# Patient Record
Sex: FEMALE | Race: WHITE | Employment: FULL TIME | URBAN - METROPOLITAN AREA
[De-identification: names, ages, dates, MRNs, and addresses within clinical notes are randomized per-mention and may not be internally consistent; named-entity substitution may affect disease eponyms.]

---

## 2017-08-23 ENCOUNTER — OFFICE VISIT (OUTPATIENT)
Dept: FAMILY MEDICINE CLINIC | Age: 57
End: 2017-08-23

## 2017-08-23 VITALS
TEMPERATURE: 98.6 F | SYSTOLIC BLOOD PRESSURE: 108 MMHG | RESPIRATION RATE: 16 BRPM | DIASTOLIC BLOOD PRESSURE: 64 MMHG | HEIGHT: 68 IN | HEART RATE: 89 BPM | BODY MASS INDEX: 26.22 KG/M2 | WEIGHT: 173 LBS | OXYGEN SATURATION: 98 %

## 2017-08-23 DIAGNOSIS — I25.10 ATHEROSCLEROSIS OF CORONARY ARTERY OF NATIVE HEART WITHOUT ANGINA PECTORIS, UNSPECIFIED VESSEL OR LESION TYPE: ICD-10-CM

## 2017-08-23 DIAGNOSIS — G43.809 VARIANTS OF MIGRAINE: ICD-10-CM

## 2017-08-23 DIAGNOSIS — K83.9 BILIARY AND GALLBLADDER DISORDER: ICD-10-CM

## 2017-08-23 DIAGNOSIS — R10.9 FLANK PAIN: ICD-10-CM

## 2017-08-23 DIAGNOSIS — Z12.11 SCREEN FOR COLON CANCER: ICD-10-CM

## 2017-08-23 DIAGNOSIS — Z00.00 WELL ADULT EXAM: Primary | ICD-10-CM

## 2017-08-23 DIAGNOSIS — I77.1 SUBCLAVIAN ARTERIAL STENOSIS (HCC): ICD-10-CM

## 2017-08-23 DIAGNOSIS — E04.1 THYROID CYST: ICD-10-CM

## 2017-08-23 DIAGNOSIS — J40 BRONCHITIS: ICD-10-CM

## 2017-08-23 DIAGNOSIS — F17.200 SMOKER: ICD-10-CM

## 2017-08-23 LAB
A/G RATIO: 1.7 (ref 1.1–2.2)
ALBUMIN SERPL-MCNC: 4.5 G/DL (ref 3.4–5)
ALP BLD-CCNC: 108 U/L (ref 40–129)
ALT SERPL-CCNC: 30 U/L (ref 10–40)
ANION GAP SERPL CALCULATED.3IONS-SCNC: 16 MMOL/L (ref 3–16)
AST SERPL-CCNC: 22 U/L (ref 15–37)
BACTERIA: ABNORMAL /HPF
BASOPHILS ABSOLUTE: 0.1 K/UL (ref 0–0.2)
BASOPHILS RELATIVE PERCENT: 0.9 %
BILIRUB SERPL-MCNC: 0.4 MG/DL (ref 0–1)
BILIRUBIN URINE: NEGATIVE
BLOOD, URINE: ABNORMAL
BUN BLDV-MCNC: 6 MG/DL (ref 7–20)
CALCIUM SERPL-MCNC: 9.8 MG/DL (ref 8.3–10.6)
CHLORIDE BLD-SCNC: 101 MMOL/L (ref 99–110)
CHOLESTEROL, TOTAL: 267 MG/DL (ref 0–199)
CLARITY: ABNORMAL
CO2: 24 MMOL/L (ref 21–32)
COLOR: YELLOW
CREAT SERPL-MCNC: 0.6 MG/DL (ref 0.6–1.1)
EOSINOPHILS ABSOLUTE: 0.2 K/UL (ref 0–0.6)
EOSINOPHILS RELATIVE PERCENT: 3.1 %
EPITHELIAL CELLS, UA: 0 /HPF (ref 0–5)
GFR AFRICAN AMERICAN: >60
GFR NON-AFRICAN AMERICAN: >60
GLOBULIN: 2.7 G/DL
GLUCOSE BLD-MCNC: 92 MG/DL (ref 70–99)
GLUCOSE URINE: NEGATIVE MG/DL
HCT VFR BLD CALC: 40.1 % (ref 36–48)
HDLC SERPL-MCNC: 60 MG/DL (ref 40–60)
HEMOGLOBIN: 13.2 G/DL (ref 12–16)
HYALINE CASTS: 1 /LPF (ref 0–8)
KETONES, URINE: NEGATIVE MG/DL
LDL CHOLESTEROL CALCULATED: 186 MG/DL
LEUKOCYTE ESTERASE, URINE: ABNORMAL
LYMPHOCYTES ABSOLUTE: 1.2 K/UL (ref 1–5.1)
LYMPHOCYTES RELATIVE PERCENT: 17.8 %
MCH RBC QN AUTO: 26.6 PG (ref 26–34)
MCHC RBC AUTO-ENTMCNC: 33 G/DL (ref 31–36)
MCV RBC AUTO: 80.5 FL (ref 80–100)
MICROSCOPIC EXAMINATION: YES
MONOCYTES ABSOLUTE: 0.9 K/UL (ref 0–1.3)
MONOCYTES RELATIVE PERCENT: 13.2 %
NEUTROPHILS ABSOLUTE: 4.3 K/UL (ref 1.7–7.7)
NEUTROPHILS RELATIVE PERCENT: 65 %
NITRITE, URINE: POSITIVE
PDW BLD-RTO: 16 % (ref 12.4–15.4)
PH UA: 6.5
PLATELET # BLD: 297 K/UL (ref 135–450)
PMV BLD AUTO: 9 FL (ref 5–10.5)
POTASSIUM SERPL-SCNC: 4.3 MMOL/L (ref 3.5–5.1)
PROTEIN UA: 30 MG/DL
RBC # BLD: 4.98 M/UL (ref 4–5.2)
RBC UA: 17 /HPF (ref 0–4)
SODIUM BLD-SCNC: 141 MMOL/L (ref 136–145)
SPECIFIC GRAVITY UA: 1.01
TOTAL PROTEIN: 7.2 G/DL (ref 6.4–8.2)
TRIGL SERPL-MCNC: 106 MG/DL (ref 0–150)
TSH SERPL DL<=0.05 MIU/L-ACNC: 0.64 UIU/ML (ref 0.27–4.2)
URINE TYPE: ABNORMAL
UROBILINOGEN, URINE: 1 E.U./DL
VLDLC SERPL CALC-MCNC: 21 MG/DL
WBC # BLD: 6.6 K/UL (ref 4–11)
WBC UA: 275 /HPF (ref 0–5)

## 2017-08-23 PROCEDURE — 99203 OFFICE O/P NEW LOW 30 MIN: CPT | Performed by: FAMILY MEDICINE

## 2017-08-23 PROCEDURE — 99386 PREV VISIT NEW AGE 40-64: CPT | Performed by: FAMILY MEDICINE

## 2017-08-23 RX ORDER — CIPROFLOXACIN 250 MG/1
250 TABLET, FILM COATED ORAL 2 TIMES DAILY
Qty: 14 TABLET | Refills: 0 | Status: SHIPPED | OUTPATIENT
Start: 2017-08-23 | End: 2017-08-30

## 2017-08-23 RX ORDER — BENZONATATE 100 MG/1
100 CAPSULE ORAL 3 TIMES DAILY PRN
Qty: 21 CAPSULE | Refills: 1 | Status: SHIPPED | OUTPATIENT
Start: 2017-08-23 | End: 2017-08-30

## 2017-08-23 RX ORDER — AZITHROMYCIN 250 MG/1
TABLET, FILM COATED ORAL
Qty: 1 PACKET | Refills: 0 | Status: SHIPPED | OUTPATIENT
Start: 2017-08-23 | End: 2018-04-26 | Stop reason: ALTCHOICE

## 2017-08-23 ASSESSMENT — ENCOUNTER SYMPTOMS
CHEST TIGHTNESS: 0
ABDOMINAL PAIN: 0
EYE REDNESS: 0
VOMITING: 0
RHINORRHEA: 0
SHORTNESS OF BREATH: 0
WHEEZING: 0
BACK PAIN: 1
TROUBLE SWALLOWING: 0
NAUSEA: 0
EYE ITCHING: 0

## 2017-08-24 LAB — HIV-1 AND HIV-2 ANTIBODIES: NORMAL

## 2017-09-07 ENCOUNTER — TELEPHONE (OUTPATIENT)
Dept: FAMILY MEDICINE CLINIC | Age: 57
End: 2017-09-07

## 2017-09-08 ENCOUNTER — HOSPITAL ENCOUNTER (OUTPATIENT)
Dept: ULTRASOUND IMAGING | Age: 57
Discharge: HOME | End: 2017-09-08
Admitting: FAMILY MEDICINE

## 2017-09-08 DIAGNOSIS — E04.1 NONTOXIC SINGLE THYROID NODULE: ICD-10-CM

## 2017-09-08 DIAGNOSIS — E04.1 THYROID CYST: ICD-10-CM

## 2017-09-19 ENCOUNTER — TELEPHONE (OUTPATIENT)
Dept: FAMILY MEDICINE CLINIC | Age: 57
End: 2017-09-19

## 2017-09-19 DIAGNOSIS — Z12.11 SCREEN FOR COLON CANCER: Primary | ICD-10-CM

## 2017-09-26 ENCOUNTER — OFFICE VISIT (OUTPATIENT)
Dept: FAMILY MEDICINE CLINIC | Age: 57
End: 2017-09-26

## 2017-09-26 VITALS
HEIGHT: 68 IN | BODY MASS INDEX: 24.55 KG/M2 | HEART RATE: 65 BPM | RESPIRATION RATE: 16 BRPM | TEMPERATURE: 98.5 F | DIASTOLIC BLOOD PRESSURE: 84 MMHG | SYSTOLIC BLOOD PRESSURE: 128 MMHG | OXYGEN SATURATION: 98 % | WEIGHT: 162 LBS

## 2017-09-26 DIAGNOSIS — E78.5 HYPERLIPIDEMIA, UNSPECIFIED HYPERLIPIDEMIA TYPE: Primary | ICD-10-CM

## 2017-09-26 PROCEDURE — 99213 OFFICE O/P EST LOW 20 MIN: CPT | Performed by: FAMILY MEDICINE

## 2017-09-26 ASSESSMENT — ENCOUNTER SYMPTOMS
SHORTNESS OF BREATH: 0
CHEST TIGHTNESS: 0

## 2017-12-14 DIAGNOSIS — E78.5 HYPERLIPIDEMIA, UNSPECIFIED HYPERLIPIDEMIA TYPE: Primary | ICD-10-CM

## 2017-12-14 LAB
A/G RATIO: 1.9 (ref 1.1–2.2)
ALBUMIN SERPL-MCNC: 4.5 G/DL (ref 3.4–5)
ALP BLD-CCNC: 100 U/L (ref 40–129)
ALT SERPL-CCNC: 33 U/L (ref 10–40)
ANION GAP SERPL CALCULATED.3IONS-SCNC: 14 MMOL/L (ref 3–16)
AST SERPL-CCNC: 23 U/L (ref 15–37)
BILIRUB SERPL-MCNC: 0.7 MG/DL (ref 0–1)
BUN BLDV-MCNC: 16 MG/DL (ref 7–20)
CALCIUM SERPL-MCNC: 9.7 MG/DL (ref 8.3–10.6)
CHLORIDE BLD-SCNC: 101 MMOL/L (ref 99–110)
CHOLESTEROL, TOTAL: 292 MG/DL (ref 0–199)
CO2: 26 MMOL/L (ref 21–32)
CREAT SERPL-MCNC: 0.5 MG/DL (ref 0.6–1.1)
GFR AFRICAN AMERICAN: >60
GFR NON-AFRICAN AMERICAN: >60
GLOBULIN: 2.4 G/DL
GLUCOSE BLD-MCNC: 84 MG/DL (ref 70–99)
HDLC SERPL-MCNC: 64 MG/DL (ref 40–60)
LDL CHOLESTEROL CALCULATED: 207 MG/DL
POTASSIUM SERPL-SCNC: 4.3 MMOL/L (ref 3.5–5.1)
SODIUM BLD-SCNC: 141 MMOL/L (ref 136–145)
TOTAL PROTEIN: 6.9 G/DL (ref 6.4–8.2)
TRIGL SERPL-MCNC: 104 MG/DL (ref 0–150)
VLDLC SERPL CALC-MCNC: 21 MG/DL

## 2017-12-15 DIAGNOSIS — E78.5 HYPERLIPIDEMIA, UNSPECIFIED HYPERLIPIDEMIA TYPE: Primary | ICD-10-CM

## 2017-12-15 RX ORDER — ATORVASTATIN CALCIUM 10 MG/1
10 TABLET, FILM COATED ORAL DAILY
Qty: 30 TABLET | Refills: 3 | Status: SHIPPED | OUTPATIENT
Start: 2017-12-15 | End: 2018-04-26 | Stop reason: SDUPTHER

## 2018-04-26 ENCOUNTER — OFFICE VISIT (OUTPATIENT)
Dept: FAMILY MEDICINE CLINIC | Age: 58
End: 2018-04-26

## 2018-04-26 VITALS
DIASTOLIC BLOOD PRESSURE: 68 MMHG | HEART RATE: 75 BPM | HEIGHT: 68 IN | TEMPERATURE: 97.1 F | SYSTOLIC BLOOD PRESSURE: 110 MMHG | OXYGEN SATURATION: 98 % | WEIGHT: 163 LBS | BODY MASS INDEX: 24.71 KG/M2 | RESPIRATION RATE: 16 BRPM

## 2018-04-26 DIAGNOSIS — E78.5 HYPERLIPIDEMIA, UNSPECIFIED HYPERLIPIDEMIA TYPE: Primary | ICD-10-CM

## 2018-04-26 DIAGNOSIS — Z11.59 NEED FOR HEPATITIS C SCREENING TEST: ICD-10-CM

## 2018-04-26 DIAGNOSIS — Z72.0 TOBACCO ABUSE: ICD-10-CM

## 2018-04-26 DIAGNOSIS — R09.89 BILATERAL CAROTID BRUITS: ICD-10-CM

## 2018-04-26 PROCEDURE — 99214 OFFICE O/P EST MOD 30 MIN: CPT | Performed by: FAMILY MEDICINE

## 2018-04-26 PROCEDURE — G8420 CALC BMI NORM PARAMETERS: HCPCS | Performed by: FAMILY MEDICINE

## 2018-04-26 PROCEDURE — 3017F COLORECTAL CA SCREEN DOC REV: CPT | Performed by: FAMILY MEDICINE

## 2018-04-26 PROCEDURE — 4004F PT TOBACCO SCREEN RCVD TLK: CPT | Performed by: FAMILY MEDICINE

## 2018-04-26 PROCEDURE — G8427 DOCREV CUR MEDS BY ELIG CLIN: HCPCS | Performed by: FAMILY MEDICINE

## 2018-04-26 RX ORDER — ATORVASTATIN CALCIUM 10 MG/1
10 TABLET, FILM COATED ORAL DAILY
Qty: 90 TABLET | Refills: 3 | Status: SHIPPED | OUTPATIENT
Start: 2018-04-26

## 2018-04-26 ASSESSMENT — PATIENT HEALTH QUESTIONNAIRE - PHQ9
SUM OF ALL RESPONSES TO PHQ9 QUESTIONS 1 & 2: 0
SUM OF ALL RESPONSES TO PHQ QUESTIONS 1-9: 0
2. FEELING DOWN, DEPRESSED OR HOPELESS: 0
1. LITTLE INTEREST OR PLEASURE IN DOING THINGS: 0

## 2018-04-26 ASSESSMENT — ENCOUNTER SYMPTOMS
CHEST TIGHTNESS: 0
ABDOMINAL DISTENTION: 0
DIARRHEA: 0
SHORTNESS OF BREATH: 0
CONSTIPATION: 0

## 2018-04-27 LAB
A/G RATIO: 1.8 (CALC) (ref 1–2.5)
ALBUMIN SERPL-MCNC: 4.3 G/DL (ref 3.6–5.1)
ALP BLD-CCNC: 98 U/L (ref 33–130)
ALT SERPL-CCNC: 18 U/L (ref 6–29)
AST SERPL-CCNC: 16 U/L (ref 10–35)
BILIRUB SERPL-MCNC: 0.6 MG/DL (ref 0.2–1.2)
BUN / CREAT RATIO: NORMAL (CALC) (ref 6–22)
BUN BLDV-MCNC: 15 MG/DL (ref 7–25)
CALCIUM SERPL-MCNC: 9.5 MG/DL (ref 8.6–10.4)
CHLORIDE BLD-SCNC: 108 MMOL/L (ref 98–110)
CHOLESTEROL: 140 MG/DL (CALC)
CO2: 25 MMOL/L (ref 20–31)
CREAT SERPL-MCNC: 0.73 MG/DL (ref 0.5–1.05)
GFR AFRICAN AMERICAN: 106 ML/MIN/1.73M2
GFR SERPL CREATININE-BSD FRML MDRD: 91 ML/MIN/1.73M2
GLOBULIN: 2.4 G/DL (CALC) (ref 1.9–3.7)
GLUCOSE BLD-MCNC: 91 MG/DL (ref 65–99)
HEPATITIS C ANTIBODY: NORMAL
HEPATITIS C VIRUS AB SIGNAL/CU: 0.02
POTASSIUM SERPL-SCNC: 4.2 MMOL/L (ref 3.5–5.3)
SODIUM BLD-SCNC: 140 MMOL/L (ref 135–146)
TOTAL PROTEIN: 6.7 G/DL (ref 6.1–8.1)

## 2018-05-07 ENCOUNTER — HOSPITAL ENCOUNTER (OUTPATIENT)
Dept: VASCULAR LAB | Age: 58
Discharge: HOME | End: 2018-05-07
Attending: FAMILY MEDICINE | Admitting: FAMILY MEDICINE

## 2018-05-07 DIAGNOSIS — R09.89 OTHER SPECIFIED SYMPTOMS AND SIGNS INVOLVING THE CIRCULATORY AND RESPIRATORY SYSTEMS: ICD-10-CM

## 2018-05-07 DIAGNOSIS — R09.89 BILATERAL CAROTID BRUITS: Primary | ICD-10-CM

## 2018-11-30 ENCOUNTER — APPOINTMENT (OUTPATIENT)
Dept: GENERAL RADIOLOGY | Age: 58
End: 2018-11-30
Payer: COMMERCIAL

## 2018-11-30 ENCOUNTER — HOSPITAL ENCOUNTER (EMERGENCY)
Age: 58
Discharge: HOME | End: 2018-11-30
Payer: COMMERCIAL

## 2018-11-30 VITALS
WEIGHT: 150 LBS | HEART RATE: 79 BPM | BODY MASS INDEX: 22.73 KG/M2 | SYSTOLIC BLOOD PRESSURE: 142 MMHG | RESPIRATION RATE: 18 BRPM | TEMPERATURE: 98.2 F | DIASTOLIC BLOOD PRESSURE: 88 MMHG | OXYGEN SATURATION: 99 % | HEIGHT: 68 IN

## 2018-11-30 DIAGNOSIS — S62.102A CLOSED FRACTURE OF LEFT WRIST, INITIAL ENCOUNTER: Primary | ICD-10-CM
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
## 2018-11-30 PROCEDURE — 73110 X-RAY EXAM OF WRIST: CPT

## 2018-11-30 PROCEDURE — 4500000023 HC ED LEVEL 3 PROCEDURE

## 2018-11-30 PROCEDURE — 6370000000 HC RX 637 (ALT 250 FOR IP): Performed by: PHYSICIAN ASSISTANT

## 2018-11-30 PROCEDURE — 99283 EMERGENCY DEPT VISIT LOW MDM: CPT

## 2018-11-30 RX ORDER — HYDROCODONE BITARTRATE AND ACETAMINOPHEN 5; 325 MG/1; MG/1
1 TABLET ORAL ONCE
Status: COMPLETED | OUTPATIENT
Start: 2018-11-30 | End: 2018-11-30

## 2018-11-30 RX ORDER — HYDROCODONE BITARTRATE AND ACETAMINOPHEN 5; 325 MG/1; MG/1
1 TABLET ORAL EVERY 6 HOURS PRN
Qty: 6 TABLET | Refills: 0 | Status: SHIPPED | OUTPATIENT
Start: 2018-11-30 | End: 2018-12-07

## 2018-11-30 RX ADMIN — HYDROCODONE BITARTRATE AND ACETAMINOPHEN 1 TABLET: 5; 325 TABLET ORAL at 23:51

## 2018-11-30 ASSESSMENT — ENCOUNTER SYMPTOMS
VOMITING: 0
WHEEZING: 0
ABDOMINAL PAIN: 0
DIARRHEA: 0
COUGH: 0
SHORTNESS OF BREATH: 0
NAUSEA: 0
RHINORRHEA: 0

## 2018-11-30 ASSESSMENT — PAIN SCALES - GENERAL
PAINLEVEL_OUTOF10: 9
PAINLEVEL_OUTOF10: 9

## 2018-12-01 NOTE — ED PROVIDER NOTES
**EVALUATED BY ADVANCED PRACTICE PROVIDER**        [address]  eMERGENCY dEPARTMENT eNCOUnter      Pt Name: Gonzalez Olmedo  IQZ:[id number]  Demetriogfmoses 1960  Date of evaluation: 11/30/2018  Provider: Erika Yusuf PA-C      Chief Complaint:    Chief Complaint   Patient presents with    Fall     fell on uneven pavement and landed on left hand       Nursing Notes, Past Medical Hx, Past Surgical Hx, Social Hx, Allergies, and Family Hx were all reviewed and agreed with or any disagreements were addressed in the HPI.    HPI:  (Location, Duration, Timing, Severity,Quality, Assoc Sx, Context, Modifying factors)  This is a  63 y.o. female that presents for evaluation of left wrist injury status post fall that occurred just prior to arrival. Patient states that she was walking through a parking lot when she tripped over an uneven part and fell, landing on outstretched hands. Pain mainly to left wrist but also contusions to bilateral palms and abrasions to knees. Tetanus up to date. No numbness, tingling or weakness distally. Able to wiggle her fingers but pain with making a fist. No other injuries or complaints at this time. PastMedical/Surgical History:      Diagnosis Date    Anemia     Anemia     Headache     Hyperlipidemia 9/26/2017    Infusion extravasation of non-chemotherapy vesicant          Procedure Laterality Date    APPENDECTOMY         Medications:  Previous Medications    ASPIRIN 81 MG TABLET    Take 81 mg by mouth daily    ATORVASTATIN (LIPITOR) 10 MG TABLET    Take 1 tablet by mouth daily    BIOTIN PO    Take by mouth    VITAMIN B-12 (CYANOCOBALAMIN) 100 MCG TABLET    Take 1 tablet by mouth daily         Review of Systems:  Review of Systems   Constitutional: Negative for appetite change, chills and fever. HENT: Negative for congestion and rhinorrhea. Respiratory: Negative for cough, shortness of breath and wheezing.     Cardiovascular: Negative for chest pain.   Gastrointestinal: Negative for abdominal pain, diarrhea, nausea and vomiting. Genitourinary: Negative for difficulty urinating, dysuria and hematuria. Musculoskeletal: Positive for arthralgias (L wrist). Negative for neck pain and neck stiffness. Skin: Negative for rash. Neurological: Negative for weakness, numbness and headaches. Positives and Pertinent negatives as per HPI. Except as noted above in the ROS, problem specific ROS was completed and is negative. Physical Exam:  Physical Exam   Constitutional: She is oriented to person, place, and time. She appears well-developed and well-nourished. HENT:   Head: Normocephalic and atraumatic. Right Ear: External ear normal.   Left Ear: External ear normal.   Nose: Nose normal.   Eyes: Right eye exhibits no discharge. Left eye exhibits no discharge. Neck: Normal range of motion. Neck supple. Cardiovascular: Intact distal pulses. Pulmonary/Chest: Effort normal. No respiratory distress. Musculoskeletal:        Left wrist: She exhibits decreased range of motion, tenderness and swelling. She exhibits no deformity. Neurological: She is alert and oriented to person, place, and time. She has normal strength. No sensory deficit. Skin: Skin is warm and dry. She is not diaphoretic. Psychiatric: She has a normal mood and affect. Her behavior is normal.   Nursing note and vitals reviewed. MEDICAL DECISION MAKING    Vitals:    Vitals:    11/30/18 2201 11/30/18 2204   BP:  (!) 142/88   Pulse: 77 79   Resp:  18   Temp: 98.2 °F (36.8 °C) 98.2 °F (36.8 °C)   TempSrc: Oral Oral   SpO2:  99%   Weight: 150 lb (68 kg)    Height: 5' 8\" (1.727 m)        LABS:Labs Reviewed - No data to display     Remainder of labs reviewed and werenegative at this time or not returned at the time of this note.     RADIOLOGY:   Non-plain film images such as CT, Ultrasound and MRI are read by the radiologist. Rhonda Ring PA-C have directly visualized the

## 2018-12-04 ENCOUNTER — OFFICE VISIT (OUTPATIENT)
Dept: ORTHOPEDIC SURGERY | Age: 58
End: 2018-12-04
Payer: COMMERCIAL

## 2018-12-04 ENCOUNTER — PRE-EVALUATION (OUTPATIENT)
Dept: ORTHOPEDIC SURGERY | Age: 58
End: 2018-12-04

## 2018-12-04 VITALS
HEART RATE: 71 BPM | BODY MASS INDEX: 22.73 KG/M2 | HEIGHT: 68 IN | DIASTOLIC BLOOD PRESSURE: 78 MMHG | SYSTOLIC BLOOD PRESSURE: 123 MMHG | WEIGHT: 150 LBS

## 2018-12-04 DIAGNOSIS — S52.572A OTHER CLOSED INTRA-ARTICULAR FRACTURE OF DISTAL END OF LEFT RADIUS, INITIAL ENCOUNTER: Primary | ICD-10-CM

## 2018-12-04 DIAGNOSIS — S52.572A OTHER CLOSED INTRA-ARTICULAR FRACTURE OF DISTAL END OF LEFT RADIUS, INITIAL ENCOUNTER: ICD-10-CM

## 2018-12-04 PROBLEM — S52.502A CLOSED FRACTURE OF LEFT DISTAL RADIUS: Status: ACTIVE | Noted: 2018-12-04

## 2018-12-04 PROCEDURE — 3017F COLORECTAL CA SCREEN DOC REV: CPT | Performed by: ORTHOPAEDIC SURGERY

## 2018-12-04 PROCEDURE — G8420 CALC BMI NORM PARAMETERS: HCPCS | Performed by: ORTHOPAEDIC SURGERY

## 2018-12-04 PROCEDURE — 25600 CLTX DST RDL FX/EPHYS SEP WO: CPT | Performed by: ORTHOPAEDIC SURGERY

## 2018-12-04 PROCEDURE — G8484 FLU IMMUNIZE NO ADMIN: HCPCS | Performed by: ORTHOPAEDIC SURGERY

## 2018-12-04 PROCEDURE — APPSS30 APP SPLIT SHARED TIME 16-30 MINUTES: Performed by: NURSE PRACTITIONER

## 2018-12-04 PROCEDURE — 99203 OFFICE O/P NEW LOW 30 MIN: CPT | Performed by: ORTHOPAEDIC SURGERY

## 2018-12-04 PROCEDURE — L3908 WHO COCK-UP NONMOLDE PRE OTS: HCPCS | Performed by: ORTHOPAEDIC SURGERY

## 2018-12-04 PROCEDURE — 4004F PT TOBACCO SCREEN RCVD TLK: CPT | Performed by: ORTHOPAEDIC SURGERY

## 2018-12-04 PROCEDURE — G8427 DOCREV CUR MEDS BY ELIG CLIN: HCPCS | Performed by: ORTHOPAEDIC SURGERY

## 2019-01-24 ENCOUNTER — OFFICE VISIT (OUTPATIENT)
Dept: ORTHOPEDIC SURGERY | Age: 59
End: 2019-01-24

## 2019-01-24 VITALS
HEIGHT: 68 IN | HEART RATE: 73 BPM | WEIGHT: 150 LBS | BODY MASS INDEX: 22.73 KG/M2 | RESPIRATION RATE: 16 BRPM | DIASTOLIC BLOOD PRESSURE: 82 MMHG | SYSTOLIC BLOOD PRESSURE: 136 MMHG

## 2019-01-24 DIAGNOSIS — S52.572A OTHER CLOSED INTRA-ARTICULAR FRACTURE OF DISTAL END OF LEFT RADIUS, INITIAL ENCOUNTER: Primary | ICD-10-CM

## 2019-01-24 PROCEDURE — 99024 POSTOP FOLLOW-UP VISIT: CPT | Performed by: ORTHOPAEDIC SURGERY

## 2019-03-26 ENCOUNTER — OFFICE VISIT (OUTPATIENT)
Dept: ENT CLINIC | Age: 59
End: 2019-03-26
Payer: COMMERCIAL

## 2019-03-26 ENCOUNTER — OFFICE VISIT (OUTPATIENT)
Dept: ORTHOPEDIC SURGERY | Age: 59
End: 2019-03-26
Payer: COMMERCIAL

## 2019-03-26 VITALS
DIASTOLIC BLOOD PRESSURE: 66 MMHG | BODY MASS INDEX: 25.04 KG/M2 | HEART RATE: 73 BPM | SYSTOLIC BLOOD PRESSURE: 108 MMHG | WEIGHT: 165.2 LBS | HEIGHT: 68 IN

## 2019-03-26 VITALS — HEIGHT: 68 IN | HEART RATE: 68 BPM | RESPIRATION RATE: 16 BRPM | WEIGHT: 150 LBS | BODY MASS INDEX: 22.73 KG/M2

## 2019-03-26 DIAGNOSIS — H93.291 DISTORTED HEARING OF RIGHT EAR: ICD-10-CM

## 2019-03-26 DIAGNOSIS — R42 DIZZINESS: Primary | ICD-10-CM

## 2019-03-26 DIAGNOSIS — S52.572A OTHER CLOSED INTRA-ARTICULAR FRACTURE OF DISTAL END OF LEFT RADIUS, INITIAL ENCOUNTER: Primary | ICD-10-CM

## 2019-03-26 DIAGNOSIS — L72.0 EPIDERMAL INCLUSION CYST: Chronic | ICD-10-CM

## 2019-03-26 DIAGNOSIS — H93.13 SUBJECTIVE TINNITUS OF BOTH EARS: ICD-10-CM

## 2019-03-26 PROCEDURE — 99213 OFFICE O/P EST LOW 20 MIN: CPT | Performed by: ORTHOPAEDIC SURGERY

## 2019-03-26 PROCEDURE — 99204 OFFICE O/P NEW MOD 45 MIN: CPT | Performed by: OTOLARYNGOLOGY

## 2019-03-26 NOTE — PROGRESS NOTES
[facility] ENT       NEW PATIENT VISIT    PCP:  Maria Teresa Ley MD    REFERRED BY:  [facility] damian pendleton irene         CHIEF COMPLAINT:  Chief Complaint   Patient presents with    Dizziness    Mass     cyst on ear lobe    Hearing Loss       HISTORY OF PRESENT ILLNESS:       (Location, Quality, Severity, Duration, Timing, Context, Modifying factors, Associated symptoms)  Obey Montes is a 63 y.o. female here for evaluation of a problem located in the ears. The quality is   dizziness. The severity is mild to moderate. The timing is off and on, comes and goes  The duration is 2 years. Dizziness was described as a sensation of movement. \"If I'm standing or sitting, I feel like I'm moving, not the room, back and from the right to left, sometimes up and down. No sensation of the room or environment spinning or whirling. Dizziness is precipitated \"if I turn my head quicking or get up fast.\"  No other precipitating factors have been noted. There is no change in hearing with dizziness. Ringing gets louder during dizziness. \"About Nov 30, I was walking across [facility] parking lot and I hit uneven pavement or a hole and fell. My ears were ringing after I got up and I was very dizzy and had a broken arm. \"        She also complained of a problem located in the right external ear. The quality is a cyst.  The severity is mild, was worse, size of a pea. Passed out and fell in bathroom and later noticed it was draining and got smaller. The duration is one year. The timing is constant, but progressively enlarging. Associated symptoms include sore and tenderness off and on. Wears head set at work. Hearing is distorted, right ear only, moderate, onset Sunday night later first noticed. Has gotten a little better. Voices sounded \"robotic\" a little echo.     Tested by playing voice mails and left ear could hear clearly and fine and but the right ear FACIAL STRENGTH, MOTION:  Normal and equal for all five branches bilaterally. EXTRAOCULAR MOTION:  intact, normal primary gaze alignment. No nystagmus at any point of gaze. EARS, OTOMICROSCOPY:  The TMs and EACs appeared to be normal bilaterally, including normal TM mobility bilaterally. (+) HEARING ASSESSMENT:  Tuning fork tests, 512 [address]z tuning fork:  Hunt lateralized to the left ear. Rinne air > bone bilaterally. Able to hear finger rub bilaterally. (+) EXTERNAL EAR/NOSE:  It was a 4 x 5 mm cystic mass in the right pinna at the end of the antihelix. Normal pinnae and mastoids. Normal external nose. (+) NOSE:  The nasal septum was severely deviated to the right with a deep crevice deformity on the left and a currently large left inferior spur. The nasal mucosa, inferior turbinates, and secretions were normal.  No pus or polyps were seen. LIPS, TEETH AND GUMS:  Normal.   OROPHARYNX:  Oral mucosa, hard and soft palates, tongue, tonsils (2+ bilaterally), and pharynx were normal.   INSPECTION OF PHARYNGEAL WALLS AND PYRIFORM SINUSES:  Normal with no pooling of saliva asymmetry or lesions.   (+) INDIRECT LARYNGOSCOPY:  Visualization was suboptimal due to a strong gag reflex and guarding. The base of tongue and epiglottis appeared to be normal.  Unable to visualize the vocal cords and hypopharynx, and endolarynx. NASOPHARYNX, MIRROR EXAMINATION:  mucosa, adenoids, posterior choanae, eustachian tube orifices, and torus tubarius appeared to be normal, bilaterally. (+) NECK:  There was a carotid bruit bilaterally, right greater than left. No masses or tenderness. No carotid bruits, abnormal appearance, asymmetry or tracheal deviation. THYROID:  No nodules, enlargement, tenderness or masses. CHEST, INSPECTION:  Normal symmetrical expansion, and respiratory effort. LUNGS, AUSCULTATION:  clear, with normal breath sounds and no rales, rhonchi, or rubs.    HEART, AUSCULTATION:  normal S1 and S2. No abnormal sounds or murmurs. No carotid bruits. PALPATION OF LYMPH NODES, CERVICAL, FACIAL AND SUPRACLAVICULAR:  Nontender, No lymphadenopathy. CRANIAL NERVES:  II - XII intact, with no apparent deficits. ORIENTATION TO TIME, PLACE, AND PERSON:  Oriented x 3. MOOD AND AFFECT:  Normal.  No evidence of depression, anxiety or agitation. CEREBELLAR TESTING:  Finger to nose, heel to shin, and rapid alternating motion intact. IMPRESSION / Huel Beer / Javier Papa / PROCEDURES:       Gerardo was seen today for dizziness, mass and hearing loss. Diagnoses and all orders for this visit:    Dizziness    Distorted hearing of right ear    Subjective tinnitus of both ears    Epidermal inclusion cyst  Comments:  right external ear. RECOMMENDATIONS / PLAN:   1. Audiogram and ENG. 2. Patient agreed to follow-up on the carotid bruits and all non-ENT related. Review of system positives with her primary care physician. 3. Return for recheck after audiogram and ENG testing.          >> Please note portions of this note were completed with a voice recognition program.  Efforts were made to edit the dictations but occasionally words are mis-transcribed.

## 2019-03-26 NOTE — PATIENT INSTRUCTIONS
ENG TESTING INSTRUCTIONS      I have recommended audiogram and ENG testing. This testing will take approximately 1.5 to 2 hours. Please be in time as the audiologist runs on schedule him and your test will need to be rescheduled if you arrive after the scheduled starting time. YOU MUST NOT TAKE ANY OF THE FOLLOWING MEDICATIONS FOR 48 - 72 HOURS BEFORE YOUR TEST:  · Any medications that can make you drowsy or sleepy  · Allergy pills  · Sedative pills   · Tranquilizers/anti-anxiety medications (Valium, Librium, Xanax, Ativan, etc.)  · Sleeping pills   · Antihistamines/Decongestants (Benadryl, Sudafed, Dimetapp, Chlor-Trimeton)  · Pain pills/Narcotics/Barbiturates (codeine, Demerol, hydrocodone, Norco, Vicodin, oxycodone, Percocet, Percodan, OxyContin, tramadol, phenobarbital, antidepressants)      · Diet pills. · Nerve/muscle relaxant pills (Robaxin, Valium)  · Anti-dizziness pills. (meclizine, Antivert, Bonine, ear patches, clonazepam/Klonopin, scopolamine/TRANSDERM-SCOP, etc.)  · Anti-nausea medication.  (promethazine/Phenergan)  · Anti motion sickeness medications (Dramamine)     · Aspirin or aspirin substitutes (Tylenol)  · Antidepressant medications;   · Alcohol (any form, beer wine whisky vodka, etc)  · Discontinued all medications, for 48 hours prior to the testing, except \"maintenance\" medications for your heart, blood pressure, diabetes, or seizures, and any medications deemed by your primary physician to be necessary. DO NOT STOP anti-seizure medications. Please consult your primary physician with any questions. MAKE SURE YOU CLEAR IT WITH YOUR PRIMARY CARE PHYSICIAN BEFORE STOPPING ANY MEDICATIONS. ADDITIONAL INSTRUCTIONS:  · Have a light breakfast and/or lunch on the day of testing. Diabetic patients must light breakfast or lunch prior to testing. · Clean face, no makeup. · Remove contact lenses before the examination. (Bring your eyeglasses).     · No solid foods for 2-4 hours before testing. · No coffee, tea, or cola after midnight on the day of testing. · No alcohol containing beverages or liquid medication containing alcohol for 48 hours before the testing. · Someone will need to drive you home after the ENG testing. Do not drive an automobile for two hours after the ENG testing. · Wear comfortable loose fitting clothing on the day of testing.

## 2019-04-11 ENCOUNTER — OFFICE VISIT (OUTPATIENT)
Dept: AUDIOLOGY | Age: 59
End: 2019-04-11
Payer: COMMERCIAL

## 2019-04-11 DIAGNOSIS — R42 DIZZINESS AND GIDDINESS: Primary | ICD-10-CM

## 2019-04-11 PROCEDURE — 92547 SUPPLEMENTAL ELECTRICAL TEST: CPT | Performed by: AUDIOLOGIST

## 2019-04-11 PROCEDURE — 92540 BASIC VESTIBULAR EVALUATION: CPT | Performed by: AUDIOLOGIST

## 2019-04-11 PROCEDURE — 92537 CALORIC VSTBLR TEST W/REC: CPT | Performed by: AUDIOLOGIST

## 2019-04-11 PROCEDURE — 92557 COMPREHENSIVE HEARING TEST: CPT | Performed by: AUDIOLOGIST

## 2019-04-11 PROCEDURE — 92550 TYMPANOMETRY & REFLEX THRESH: CPT | Performed by: AUDIOLOGIST

## 2019-05-03 ENCOUNTER — OFFICE VISIT (OUTPATIENT)
Dept: ENT CLINIC | Age: 59
End: 2019-05-03
Payer: COMMERCIAL

## 2019-05-03 VITALS
DIASTOLIC BLOOD PRESSURE: 72 MMHG | HEIGHT: 68 IN | BODY MASS INDEX: 24.4 KG/M2 | SYSTOLIC BLOOD PRESSURE: 108 MMHG | HEART RATE: 63 BPM | WEIGHT: 161 LBS | OXYGEN SATURATION: 98 %

## 2019-05-03 DIAGNOSIS — H93.13 SUBJECTIVE TINNITUS OF BOTH EARS: Chronic | ICD-10-CM

## 2019-05-03 DIAGNOSIS — R42 DIZZINESS: Primary | Chronic | ICD-10-CM

## 2019-05-03 DIAGNOSIS — L72.0 EPIDERMAL INCLUSION CYST: Chronic | ICD-10-CM

## 2019-05-03 PROCEDURE — 99214 OFFICE O/P EST MOD 30 MIN: CPT | Performed by: OTOLARYNGOLOGY

## 2019-05-03 NOTE — PROGRESS NOTES
[address] [place] [address] ENT       CHIEF COMPLAINT:  Chief Complaint   Patient presents with    Dizziness       INTERVAL HISTORY:        Since the last visit here, dizziness is better or gone. She has had no dizziness since the audiogram and ENG was done. Cyst on right pinna has been present for about one year. It is very small now. EXAMINATION:    External ear: There was a 1 mm nodule on the right pinna. .  There was a small minor abrasion of the mid lateral helix of the left ear. Patient stated she bumped her ear while helping her children at home. Otherwise, the external ears were normal bilaterally. AUDIOGRAM:                  ENG (see full report for details): COUNSELING:    The audiogram results were reviewed and discussed with [facility], whom I advised of the normal auditory function demonstrated with no evidence of hearing loss. I advised her of the normal ENG results with no evidence of vestibular dysfunction demonstrated. However, it should be noted that the cool calorics were done at [facility] because the air conditioning was not working in the testing room. I discussed the possibility of intermittent vestibular and/or auditory dysfunction, which could not be ruled out. I discussed possible impairment due to auditory and vestibular dysfunction, if present. I advised avoidance of excessive exposure to loud noises and the use of noise protection equipment whenever such exposure is unavoidable. I discussed the etiology of tinnitus and treatment/coping measures including masking techniques, and need for annual and prn hearing tests. I discussed the functions of the vestibular system. Discussed possible etiologies of dizziness. I discussed possibility of intermittent vestibular dysfunction. I discussed vestibular exercises, and possible benefits of vestibular rehabilitation therapy.   I discussed activity and safety precautions, fall prevention and avoidance, and need for continued follow-up. I discussed possible etiologies of dizziness including, but not limited to: Meniere's disease, vestibular neuronitis, acute labyrinthitis, idiopathic peripheral vestibular dysfunction, benign paroxysmal positional vertigo, autoimmune hearing loss and vestibular dysfunction, multiple sclerosis, vestibular schwannoma (acoustic neuroma), CNS neoplasm, and other CNS disease. Juan Yang / Stacie Brothers / Christin Son / PROCEDURES:       Radha Chavez was seen today for dizziness. Diagnoses and all orders for this visit:    Dizziness  Comments:  quiescent. Subjective tinnitus of both ears    Epidermal inclusion cyst         RECOMMENDATIONS / PLAN:   1. Removal of cyst of right pinna if reaccumulates and enlarges. She agreed to call for an appointment if it enlarges. 2. See Patient Instructions on file for  for this visit, which were fully discussed with the patient  3. Return if symptoms worsen or, for any further Ear, Nose, Throat, or Sinus problems. TIME:    Total visit time = 25 minutes; Counseling time = 18 minutes. >> Please note portions of this note were completed with a voice recognition program.  Efforts were made to edit the dictations but occasionally words are mis-transcribed.

## 2019-05-03 NOTE — PATIENT INSTRUCTIONS
1. Apply polysporin or neosporin ointment to the scratch on your left ear three times a day until healed and call for evaluation if it fails to heal in 6 weeks. 2. Observe the cyst/nodule on your right ear and call for evaluation if it enlarges to pea size o0r becomes painful and/or tender. 3. Call for evaluation if dizziness worsens or continues to recur. 4. Avoid working or being at heights, on ladders or scaffolding or near the edges of platforms or using heavy or complicated machinery or operating a motorized vehicle if you are experiencing dizziness and until having been dizzy free for 72 hours. Even then, take appropriate care and precautions as dizziness could occur at any time. 5. Avoid any motions or activity that results in the off balance sensation. Stand up or arise slowly and in stages, as we discussed. 6. You should return for an annual hearing test to monitor your hearing, and whenever your hearing further decreases significantly. 7. You should return if your ears plug up with ear wax causing difficulty hearing or pressure. 8. You should employ the tinnitus masking techniques and strategies we discussed, as needed. Bedside tinnitus masking devices (eg. a white noise machine, noise machine, noise karen on your cell phone, fan on in the room, radio with light music or tuned between stations to white noise static) can be very helpful. 9. You should avoid exposure to excessively high levels of noise/sound and use hearing protection measures we discussed, as needed, if such exposure is unavoidable. 10. NO Q-TIPS IN THE EARS  You should never clean your ears with a Q-tip, cotton tipped applicator, Norah pin, paper clip, or any other instrument. I recommend only use of one the several ear wax removal kits available \"over the counter\" if you feel a need to try to remove ear wax.    No other methods should be self used for this purpose as there is danger of injury to the ear and risk of irreparable and irreversible permanent hearing loss.

## 2019-07-23 ENCOUNTER — TELEPHONE (OUTPATIENT)
Dept: FAMILY MEDICINE CLINIC | Age: 59
End: 2019-07-23

## 2019-08-13 DIAGNOSIS — R92.8 ABNORMAL MAMMOGRAM: Primary | ICD-10-CM

## 2022-05-01 LAB — MAMMOGRAPHY, EXTERNAL: NORMAL

## 2022-12-14 ENCOUNTER — ANESTHESIA (OUTPATIENT)
Dept: ENDOSCOPY | Age: 62
End: 2022-12-14
Payer: COMMERCIAL

## 2022-12-14 ENCOUNTER — HOSPITAL ENCOUNTER (OUTPATIENT)
Age: 62
Setting detail: OUTPATIENT SURGERY
Discharge: HOME | End: 2022-12-14
Attending: INTERNAL MEDICINE | Admitting: INTERNAL MEDICINE
Payer: COMMERCIAL

## 2022-12-14 ENCOUNTER — ANESTHESIA EVENT (OUTPATIENT)
Dept: ENDOSCOPY | Age: 62
End: 2022-12-14
Payer: COMMERCIAL

## 2022-12-14 VITALS
HEART RATE: 62 BPM | RESPIRATION RATE: 14 BRPM | TEMPERATURE: 97.1 F | BODY MASS INDEX: 18.64 KG/M2 | DIASTOLIC BLOOD PRESSURE: 79 MMHG | OXYGEN SATURATION: 96 % | HEIGHT: 68 IN | WEIGHT: 123 LBS | SYSTOLIC BLOOD PRESSURE: 137 MMHG

## 2022-12-14 PROCEDURE — 3700000001 HC ADD 15 MINUTES (ANESTHESIA): Performed by: INTERNAL MEDICINE

## 2022-12-14 PROCEDURE — 6360000002 HC RX W HCPCS: Performed by: NURSE ANESTHETIST, CERTIFIED REGISTERED

## 2022-12-14 PROCEDURE — 7100000011 HC PHASE II RECOVERY - ADDTL 15 MIN: Performed by: INTERNAL MEDICINE

## 2022-12-14 PROCEDURE — 3609027000 HC COLONOSCOPY: Performed by: INTERNAL MEDICINE

## 2022-12-14 PROCEDURE — 7100000010 HC PHASE II RECOVERY - FIRST 15 MIN: Performed by: INTERNAL MEDICINE

## 2022-12-14 PROCEDURE — 2709999900 HC NON-CHARGEABLE SUPPLY: Performed by: INTERNAL MEDICINE

## 2022-12-14 PROCEDURE — 3700000000 HC ANESTHESIA ATTENDED CARE: Performed by: INTERNAL MEDICINE

## 2022-12-14 PROCEDURE — 2580000003 HC RX 258: Performed by: ANESTHESIOLOGY

## 2022-12-14 RX ORDER — PROPOFOL 10 MG/ML
INJECTION, EMULSION INTRAVENOUS CONTINUOUS PRN
Status: DISCONTINUED | OUTPATIENT
Start: 2022-12-14 | End: 2022-12-14 | Stop reason: SDUPTHER

## 2022-12-14 RX ORDER — LIDOCAINE HYDROCHLORIDE 20 MG/ML
INJECTION, SOLUTION INTRAVENOUS PRN
Status: DISCONTINUED | OUTPATIENT
Start: 2022-12-14 | End: 2022-12-14 | Stop reason: SDUPTHER

## 2022-12-14 RX ORDER — SODIUM CHLORIDE 0.9 % (FLUSH) 0.9 %
5-40 SYRINGE (ML) INJECTION EVERY 12 HOURS SCHEDULED
Status: DISCONTINUED | OUTPATIENT
Start: 2022-12-14 | End: 2022-12-14 | Stop reason: HOSPADM

## 2022-12-14 RX ORDER — SODIUM CHLORIDE, SODIUM LACTATE, POTASSIUM CHLORIDE, CALCIUM CHLORIDE 600; 310; 30; 20 MG/100ML; MG/100ML; MG/100ML; MG/100ML
INJECTION, SOLUTION INTRAVENOUS CONTINUOUS
Status: DISCONTINUED | OUTPATIENT
Start: 2022-12-14 | End: 2022-12-14 | Stop reason: HOSPADM

## 2022-12-14 RX ORDER — LIDOCAINE HYDROCHLORIDE 10 MG/ML
1 INJECTION, SOLUTION EPIDURAL; INFILTRATION; INTRACAUDAL; PERINEURAL
Status: DISCONTINUED | OUTPATIENT
Start: 2022-12-14 | End: 2022-12-14 | Stop reason: HOSPADM

## 2022-12-14 RX ORDER — DOXYCYCLINE HYCLATE 50 MG/1
324 CAPSULE, GELATIN COATED ORAL
COMMUNITY

## 2022-12-14 RX ORDER — SODIUM CHLORIDE 9 MG/ML
INJECTION, SOLUTION INTRAVENOUS PRN
Status: DISCONTINUED | OUTPATIENT
Start: 2022-12-14 | End: 2022-12-14 | Stop reason: HOSPADM

## 2022-12-14 RX ORDER — SODIUM CHLORIDE 0.9 % (FLUSH) 0.9 %
5-40 SYRINGE (ML) INJECTION PRN
Status: DISCONTINUED | OUTPATIENT
Start: 2022-12-14 | End: 2022-12-14 | Stop reason: HOSPADM

## 2022-12-14 RX ADMIN — SODIUM CHLORIDE, POTASSIUM CHLORIDE, SODIUM LACTATE AND CALCIUM CHLORIDE: 600; 310; 30; 20 INJECTION, SOLUTION INTRAVENOUS at 12:35

## 2022-12-14 RX ADMIN — LIDOCAINE HYDROCHLORIDE 100 MG: 20 INJECTION, SOLUTION INTRAVENOUS at 13:54

## 2022-12-14 RX ADMIN — PROPOFOL 328.55 MCG/KG/MIN: 10 INJECTION, EMULSION INTRAVENOUS at 13:54

## 2022-12-14 ASSESSMENT — LIFESTYLE VARIABLES: SMOKING_STATUS: 1

## 2022-12-14 ASSESSMENT — PAIN SCALES - WONG BAKER
WONGBAKER_NUMERICALRESPONSE: 0
WONGBAKER_NUMERICALRESPONSE: 0

## 2022-12-14 NOTE — ANESTHESIA POSTPROCEDURE EVALUATION
Department of Anesthesiology  Postprocedure Note    Patient: Ondina Tatum  MRN: [id number]  YOB: 1960  Date of evaluation: 12/14/2022      Procedure Summary     Date: 12/14/22 Room / Location: [facility]    Anesthesia Start: 5217 Anesthesia Stop: 1416    Procedure: COLONOSCOPY Diagnosis:       Special screening for malignant neoplasm of colon      (Special screening for malignant neoplasm of colon [Z12.11])    Surgeons: Stephon Camacho MD Responsible Provider: Neyda Kang MD    Anesthesia Type: MAC ASA Status: 2          Anesthesia Type: No value filed.     Maren Phase I: Maren Score: 10    Maren Phase II: Maren Score: 9      Anesthesia Post Evaluation    Patient location during evaluation: PACU  Patient participation: complete - patient participated  Level of consciousness: awake and alert  Airway patency: patent  Nausea & Vomiting: no nausea and no vomiting  Complications: no  Cardiovascular status: hemodynamically stable  Respiratory status: acceptable  Hydration status: euvolemic  Multimodal analgesia pain management approach

## 2022-12-14 NOTE — H&P
[facility] ENDOSCOPY  Outpatient Procedure H&P    Patient: Risa Reyes MRN: [id number]     YOB: 1960  Age: 65 y.o. Sex: female    Unit: [facility] ENDOSCOPY Room/Bed: Endo Pool/NONE Location: [address] [place] [address]     Procedure: Procedure(s):  COLONOSCOPY    Indication: Special screening for malignant neoplasm of colon [Z12.11]    Referring  Physician:          Nurses past medical history notes reviewed and agreed. Medications reviewed.     Allergies: Codeine, Other, and Penicillins     Allergies noted: Yes     Past Medical History:   Past Medical History:   Diagnosis Date    Anemia     Anemia     Headache     Hyperlipidemia 9/26/2017    Infusion extravasation of non-chemotherapy vesicant        Past Surgical History:   Past Surgical History:   Procedure Laterality Date    APPENDECTOMY         Social History:   Social History     Socioeconomic History    Marital status:      Spouse name: Not on file    Number of children: Not on file    Years of education: Not on file    Highest education level: Not on file   Occupational History    Not on file   Tobacco Use    Smoking status: Every Day     Packs/day: 0.25     Types: Cigarettes     Start date: 3/26/1978    Smokeless tobacco: Never    Tobacco comments:     working on stopping   Vaping Use    Vaping Use: Never used   Substance and Sexual Activity    Alcohol use: Yes     Comment: socially     Drug use: No    Sexual activity: Yes     Partners: Male   Other Topics Concern    Not on file   Social History Narrative    Not on file     Social Determinants of Health     Financial Resource Strain: Not on file   Food Insecurity: Not on file   Transportation Needs: Not on file   Physical Activity: Not on file   Stress: Not on file   Social Connections: Not on file   Intimate Partner Violence: Not on file   Housing Stability: Not on file       Family History:   Family History   Problem Relation Age of Onset    Heart Disease Mother     Kidney Cancer Mother Albumin 04/26/2018 4.3  3.6 - 5.1 g/dL Final    Globulin 04/26/2018 2.4  1.9 - 3.7 g/dL (calc) Final    Albumin/Globulin Ratio 04/26/2018 1.8  1.0 - 2.5 (calc) Final    Total Bilirubin 04/26/2018 0.6  0.2 - 1.2 mg/dL Final    Alkaline Phosphatase 04/26/2018 98  33 - 130 U/L Final    AST 04/26/2018 16  10 - 35 U/L Final    ALT 04/26/2018 18  6 - 29 U/L Final    Cholesterol 04/26/2018 140 (A)  <130 mg/dL (calc) Final    Hepatitis C Ab 04/26/2018 NON-REACTIVE  NON-REACTIVE Final    Hepatitis C Virus Ab Signal/Cu 04/26/2018 0.02  <1.00 Final        Imaging:  No orders to display       ASA:2    Mallampati Score: II    Sedation planned: MAC    Patient in acceptable condition for procedure:Yes    1:43 PM 12/14/2022    Sourav Trejo MD      Please note that some or all of this record was generated using voice recognition software. If there are any questions about the content of this document, please contact the author as some errors in transcription may have occurred.

## 2022-12-14 NOTE — OP NOTE
Colonoscopy Procedure Note    Patient: Sudeep Burnette MRN: [id number]     YOB: 1960  Age: 65 y.o. Sex: female    Unit: [address] [place], [place] [address]e N ENDOSCOPY Room/Bed: [facility]/NONE Location: [address] [place] [address]       Colonoscopy    Admitting Physician: Erinn Pompa, [facility]     Primary Care Physician: SEUN Ross NP      Preoperative Diagnosis: Special screening for malignant neoplasm of colon [Z12.11]      DATE OF OPERATION: 12/14/2022    OPERATIVE SURGEON: Mychal Kim MD      ANESTHESIA: Monitor Anesthesia Care      Procedure Details:    After informed consent was obtained with all risks and benefits of procedure explained and preoperative exam completed, the patient was taken to the endoscopy suite and placed in the left lateral decubitus position. Upon sequential sedation as per above, a digital rectal exam was performed and was normal.  The Olympus videocolonoscope  was inserted in the rectum and carefully advanced to the cecum. Cecum Intubated Yes. The quality of preparation was good. The colonoscope was slowly withdrawn with careful evaluation between folds. Retroflexion in the rectum was performed. Estimated Blood Loss: minimal    Complications:  none    Findings:   normal colonic mucosa throughout  hemorrhoids internal and external, Small in size    Plan: Repeat colonoscopy in 5 years due to family history of colon cancer.         Signed By: Mychal Kim MD

## 2022-12-14 NOTE — ANESTHESIA PRE PROCEDURE
Department of Anesthesiology  Preprocedure Note       Name:  Prasad Cespedes   Age:  65 y.o.  :  1960                                          MRN:  [id number]         Date:  2022      Surgeon: Demetri Mueller):  Pola Siu MD    Procedure: Procedure(s):  COLONOSCOPY    Medications prior to admission:   Prior to Admission medications    Medication Sig Start Date End Date Taking? Authorizing Provider   Multiple Vitamins-Minerals (MULTIVITAL PO) Take 1 tablet by mouth daily    Historical Provider, MD   aspirin 81 MG tablet Take 81 mg by mouth daily    Historical Provider, MD   atorvastatin (LIPITOR) 10 MG tablet Take 1 tablet by mouth daily 18   Jemal Haddad MD   vitamin B-12 (CYANOCOBALAMIN) 100 MCG tablet Take 1 tablet by mouth daily 7/24/17 5/3/19  Jacki Richardson MD       Current medications:    Current Facility-Administered Medications   Medication Dose Route Frequency Provider Last Rate Last Admin    lidocaine PF 1 % injection 1 mL  1 mL IntraDERmal Once PRN Reginaldo Salomon MD        lactated ringers infusion   IntraVENous Continuous Reginaldo Salomon  mL/hr at 22 1235 New Bag at 22 1235    sodium chloride flush 0.9 % injection 5-40 mL  5-40 mL IntraVENous 2 times per day Reginaldo Salomon MD        sodium chloride flush 0.9 % injection 5-40 mL  5-40 mL IntraVENous PRN Reginaldo Salomon MD        0.9 % sodium chloride infusion   IntraVENous PRN Reginaldo Salomon MD           Allergies:     Allergies   Allergen Reactions    Codeine Other (See Comments)     \"I pass out\"     Other Diarrhea and Nausea And Vomiting     Artifical sweeteners      Penicillins Rash and Hives       Problem List:    Patient Active Problem List   Diagnosis Code    Diplopia H53.2    Myasthenia ([address] G70.00    MS (multiple sclerosis) ([facility] [address] G35    Variants of migraine G43.809    Myopathy G72.9    Hyperlipidemia E78.5    Closed fracture of left distal radius S52.502A    Solitary cyst of breast N60.09    Lump or mass in breast N63.0       Past Medical History:        Diagnosis Date    Anemia     Anemia     Headache     Hyperlipidemia 9/26/2017    Infusion extravasation of non-chemotherapy vesicant        Past Surgical History:        Procedure Laterality Date    APPENDECTOMY         Social History:    Social History     Tobacco Use    Smoking status: Every Day     Packs/day: 0.25     Types: Cigarettes     Start date: 3/26/1978    Smokeless tobacco: Never    Tobacco comments:     working on stopping   Substance Use Topics    Alcohol use: Yes     Comment: socially                                 Ready to quit: Not Answered  Counseling given: Not Answered  Tobacco comments: working on stopping      Vital Signs (Current):   Vitals:    12/14/22 1217   BP: 123/78   Pulse: 79   Resp: 14   Temp: 98.1 °F (36.7 °C)   TempSrc: Temporal   SpO2: 100%   Weight: 123 lb (55.8 kg)   Height: 5' 8\" (1.727 m)                                              BP Readings from Last 3 Encounters:   12/14/22 123/78   05/03/19 108/72   03/26/19 108/66       NPO Status:                                                                                 BMI:   Wt Readings from Last 3 Encounters:   12/14/22 123 lb (55.8 kg)   05/03/19 161 lb (73 kg)   03/26/19 165 lb 3.2 oz (74.9 kg)     Body mass index is 18.7 kg/m².     CBC:   Lab Results   Component Value Date/Time    WBC 6.6 08/23/2017 11:36 AM    RBC 4.98 08/23/2017 11:36 AM    HGB 13.2 08/23/2017 11:36 AM    HCT 40.1 08/23/2017 11:36 AM    MCV 80.5 08/23/2017 11:36 AM    RDW 16.0 08/23/2017 11:36 AM     08/23/2017 11:36 AM       CMP:   Lab Results   Component Value Date/Time     04/26/2018 09:24 AM    K 4.2 04/26/2018 09:24 AM     04/26/2018 09:24 AM    CO2 25 04/26/2018 09:24 AM    BUN 15 04/26/2018 09:24 AM    CREATININE 0.73 04/26/2018 09:24 AM    GFRAA 106 04/26/2018 09:24 AM    AGRATIO 1.8 04/26/2018 09:24 AM    LABGLOM 91 04/26/2018 09:24 AM    GLUCOSE 91 04/26/2018 09:24 AM    PROT 6.7 04/26/2018 09:24 AM    CALCIUM 9.5 04/26/2018 09:24 AM    BILITOT 0.6 04/26/2018 09:24 AM    ALKPHOS 98 04/26/2018 09:24 AM    AST 16 04/26/2018 09:24 AM    ALT 18 04/26/2018 09:24 AM       POC Tests: No results for input(s): POCGLU, POCNA, POCK, POCCL, POCBUN, POCHEMO, POCHCT in the last 72 hours. Coags: No results found for: PROTIME, INR, APTT    HCG (If Applicable): No results found for: PREGTESTUR, PREGSERUM, HCG, HCGQUANT     ABGs: No results found for: PHART, PO2ART, PPC9JJO, SGG1XSR, BEART, A3EMMYAV     Type & Screen (If Applicable):  No results found for: LABABO, LABRH    Drug/Infectious Status (If Applicable):  Lab Results   Component Value Date/Time    HEPCAB NON-REACTIVE 04/26/2018 09:24 AM       COVID-19 Screening (If Applicable): No results found for: COVID19        Anesthesia Evaluation  Patient summary reviewed and Nursing notes reviewed no history of anesthetic complications:   Airway: Mallampati: II  TM distance: >3 FB   Neck ROM: full  Mouth opening: > = 3 FB   Dental: normal exam         Pulmonary:normal exam    (+) current smoker                           Cardiovascular:Negative CV ROS                      Neuro/Psych:   (+) neuromuscular disease:, headaches:,             GI/Hepatic/Renal: Neg GI/Hepatic/Renal ROS            Endo/Other: Negative Endo/Other ROS                    Abdominal:             Vascular: Other Findings:           Anesthesia Plan      MAC     ASA 2       Induction: intravenous. MIPS: Prophylactic antiemetics administered. Anesthetic plan and risks discussed with patient. Plan discussed with CRNA.     Attending anesthesiologist reviewed and agrees with Preprocedure content                Jaden Weir MD   12/14/2022

## 2022-12-14 NOTE — DISCHARGE INSTRUCTIONS
ENDOSCOPY DISCHARGE INSTRUCTIONS:    Call the physician that did your procedure for any questions or concern:    [facility]: [phone]  DR. JOSEPHINE EPPERSON          ACTIVITY:    There are potential side effects to the medications used for sedation and anesthesia during your procedure. These include:  Dizziness or light-headedness, confusion or memory loss, delayed reaction times, loss of coordination, nausea and vomiting. Because of your increased risk for injury, we ask that you observe the following precautions: For the next 24 hours,  DO NOT operate an automobile, bicycle, motorcycle, , power tools or large equipment of any kind. Do not drink alcohol, sign any legal documents or make any legal decisions for 24 hours. Do not bend your head over lower than your heart. DO sit on the side of bed/couch awhile before getting up. Plan on bedrest or quiet relaxation today. You may resume normal activities in 24 hours. DIET:    Your first meal today should be light, avoiding spicy and fatty foods. If you tolerate this first meal, then you may advance to your regular diet unless otherwise advised by your physician. NORMAL SYMPTOMS:  -Gaseous discomfort    NOTIFY YOUR PHYSICIAN IF THESE SYMPTOMS OCCUR:  1. Fever (greater than 100)  5. Increased abdominal bloating  2. Severe pain    6. Excessive bleeding  3. Nausea and vomiting  7. Chest pain                                                                    4. Chills    8. Shortness of breath    ADDITIONAL INSTRUCTIONS:    Biopsy results: NONE    Educational Information:    Findings:   normal colonic mucosa throughout  hemorrhoids internal and external, Small in size     Plan: Repeat colonoscopy in 5 years due to family history of colon cancer          Please review these discharge instructions this evening or tomorrow for  information you may have forgotten.             We want to thank you for choosing the [facility] as your health care provider. We always strive to provide you with excellent care while you are here. You may receive a survey in the mail regarding your care. We would appreciate you taking a few minutes of your time to complete this survey.

## 2022-12-14 NOTE — PROGRESS NOTES
Ambulatory Surgery/Procedure Discharge Note    Vitals:    12/14/22 1430   BP: 137/79   Pulse: 62   Resp: 14   Temp: 97.1 °F (36.2 °C)   SpO2: 96%       No intake/output data recorded. Restroom use offered before discharge. Yes  Pt refuse toileting offered. Tolerates PO well and denies nausea. Discharge instructions were read at bedside. Pain assessment:  none           Patient discharged to home/self care.  Patient discharged via wheel chair by transporter to waiting family/S.O.       12/14/2022 2:55 PM

## 2024-06-24 ENCOUNTER — HOSPITAL ENCOUNTER (INPATIENT)
Age: 64
LOS: 4 days | Discharge: HOME | End: 2024-06-28
Attending: STUDENT IN AN ORGANIZED HEALTH CARE EDUCATION/TRAINING PROGRAM | Admitting: STUDENT IN AN ORGANIZED HEALTH CARE EDUCATION/TRAINING PROGRAM
Payer: COMMERCIAL

## 2024-06-24 ENCOUNTER — APPOINTMENT (OUTPATIENT)
Dept: CT IMAGING | Age: 64
End: 2024-06-24
Payer: COMMERCIAL

## 2024-06-24 ENCOUNTER — APPOINTMENT (OUTPATIENT)
Dept: GENERAL RADIOLOGY | Age: 64
End: 2024-06-24
Payer: COMMERCIAL

## 2024-06-24 DIAGNOSIS — R94.31 ABNORMAL ELECTROCARDIOGRAPHY: ICD-10-CM

## 2024-06-24 DIAGNOSIS — I48.91 ATRIAL FIBRILLATION, UNSPECIFIED TYPE (HCC): ICD-10-CM

## 2024-06-24 DIAGNOSIS — F17.200 SMOKER: ICD-10-CM

## 2024-06-24 DIAGNOSIS — I49.9 IRREGULAR CARDIAC RHYTHM: ICD-10-CM

## 2024-06-24 DIAGNOSIS — I24.9 ACUTE CORONARY SYNDROME (HCC): ICD-10-CM

## 2024-06-24 DIAGNOSIS — I48.91 ATRIAL FIBRILLATION WITH RVR (HCC): Primary | ICD-10-CM

## 2024-06-24 DIAGNOSIS — R07.89 INTERMITTENT RIGHT-SIDED CHEST PAIN: ICD-10-CM

## 2024-06-24 LAB
ALBUMIN SERPL-MCNC: 4.2 G/DL (ref 3.4–5)
ALBUMIN/GLOB SERPL: 1.6 {RATIO} (ref 1.1–2.2)
ALP SERPL-CCNC: 81 U/L (ref 40–129)
ALT SERPL-CCNC: 25 U/L (ref 10–40)
ANION GAP SERPL CALCULATED.3IONS-SCNC: 11 MMOL/L (ref 3–16)
AST SERPL-CCNC: 24 U/L (ref 15–37)
BASOPHILS # BLD: 0 K/UL (ref 0–0.2)
BASOPHILS NFR BLD: 0.3 %
BILIRUB SERPL-MCNC: 0.6 MG/DL (ref 0–1)
BUN SERPL-MCNC: 11 MG/DL (ref 7–20)
CALCIUM SERPL-MCNC: 9.3 MG/DL (ref 8.3–10.6)
CHLORIDE SERPL-SCNC: 104 MMOL/L (ref 99–110)
CO2 SERPL-SCNC: 26 MMOL/L (ref 21–32)
CREAT SERPL-MCNC: 0.7 MG/DL (ref 0.6–1.2)
D-DIMER QUANTITATIVE: 0.63 UG/ML FEU (ref 0–0.6)
DEPRECATED RDW RBC AUTO: 16.1 % (ref 12.4–15.4)
DEPRECATED RDW RBC AUTO: 16.1 % (ref 12.4–15.4)
EOSINOPHIL # BLD: 0.9 K/UL (ref 0–0.6)
EOSINOPHIL NFR BLD: 15 %
GFR SERPLBLD CREATININE-BSD FMLA CKD-EPI: >90 ML/MIN/{1.73_M2}
GLUCOSE SERPL-MCNC: 92 MG/DL (ref 70–99)
HCT VFR BLD AUTO: 34.3 % (ref 36–48)
HCT VFR BLD AUTO: 34.5 % (ref 36–48)
HGB BLD-MCNC: 11.5 G/DL (ref 12–16)
HGB BLD-MCNC: 11.5 G/DL (ref 12–16)
LYMPHOCYTES # BLD: 2.1 K/UL (ref 1–5.1)
LYMPHOCYTES NFR BLD: 34 %
MCH RBC QN AUTO: 26.7 PG (ref 26–34)
MCH RBC QN AUTO: 26.9 PG (ref 26–34)
MCHC RBC AUTO-ENTMCNC: 33.3 G/DL (ref 31–36)
MCHC RBC AUTO-ENTMCNC: 33.5 G/DL (ref 31–36)
MCV RBC AUTO: 80.2 FL (ref 80–100)
MCV RBC AUTO: 80.2 FL (ref 80–100)
MONOCYTES # BLD: 0.5 K/UL (ref 0–1.3)
MONOCYTES NFR BLD: 8.1 %
NEUTROPHILS # BLD: 2.6 K/UL (ref 1.7–7.7)
NEUTROPHILS NFR BLD: 42.6 %
NT-PROBNP SERPL-MCNC: 547 PG/ML (ref 0–124)
PLATELET # BLD AUTO: 279 K/UL (ref 135–450)
PLATELET # BLD AUTO: 297 K/UL (ref 135–450)
PMV BLD AUTO: 8.2 FL (ref 5–10.5)
PMV BLD AUTO: 8.4 FL (ref 5–10.5)
POTASSIUM SERPL-SCNC: 4.2 MMOL/L (ref 3.5–5.1)
PROT SERPL-MCNC: 6.8 G/DL (ref 6.4–8.2)
RBC # BLD AUTO: 4.27 M/UL (ref 4–5.2)
RBC # BLD AUTO: 4.3 M/UL (ref 4–5.2)
SODIUM SERPL-SCNC: 141 MMOL/L (ref 136–145)
TROPONIN, HIGH SENSITIVITY: 21 NG/L (ref 0–14)
TROPONIN, HIGH SENSITIVITY: 24 NG/L (ref 0–14)
WBC # BLD AUTO: 6 K/UL (ref 4–11)
WBC # BLD AUTO: 6.2 K/UL (ref 4–11)

## 2024-06-24 PROCEDURE — 71260 CT THORAX DX C+: CPT

## 2024-06-24 PROCEDURE — 71045 X-RAY EXAM CHEST 1 VIEW: CPT

## 2024-06-24 PROCEDURE — 1200000000 HC SEMI PRIVATE

## 2024-06-24 PROCEDURE — 85379 FIBRIN DEGRADATION QUANT: CPT

## 2024-06-24 PROCEDURE — 80053 COMPREHEN METABOLIC PANEL: CPT

## 2024-06-24 PROCEDURE — 85610 PROTHROMBIN TIME: CPT

## 2024-06-24 PROCEDURE — 85730 THROMBOPLASTIN TIME PARTIAL: CPT

## 2024-06-24 PROCEDURE — 2580000003 HC RX 258: Performed by: STUDENT IN AN ORGANIZED HEALTH CARE EDUCATION/TRAINING PROGRAM

## 2024-06-24 PROCEDURE — 83880 ASSAY OF NATRIURETIC PEPTIDE: CPT

## 2024-06-24 PROCEDURE — 85027 COMPLETE CBC AUTOMATED: CPT

## 2024-06-24 PROCEDURE — 85520 HEPARIN ASSAY: CPT

## 2024-06-24 PROCEDURE — 99285 EMERGENCY DEPT VISIT HI MDM: CPT

## 2024-06-24 PROCEDURE — 84484 ASSAY OF TROPONIN QUANT: CPT

## 2024-06-24 PROCEDURE — 6360000004 HC RX CONTRAST MEDICATION: Performed by: NURSE PRACTITIONER

## 2024-06-24 PROCEDURE — 93005 ELECTROCARDIOGRAM TRACING: CPT | Performed by: STUDENT IN AN ORGANIZED HEALTH CARE EDUCATION/TRAINING PROGRAM

## 2024-06-24 PROCEDURE — 84443 ASSAY THYROID STIM HORMONE: CPT

## 2024-06-24 PROCEDURE — 6370000000 HC RX 637 (ALT 250 FOR IP): Performed by: NURSE PRACTITIONER

## 2024-06-24 PROCEDURE — 6370000000 HC RX 637 (ALT 250 FOR IP): Performed by: STUDENT IN AN ORGANIZED HEALTH CARE EDUCATION/TRAINING PROGRAM

## 2024-06-24 PROCEDURE — 96365 THER/PROPH/DIAG IV INF INIT: CPT

## 2024-06-24 PROCEDURE — 2500000003 HC RX 250 WO HCPCS: Performed by: STUDENT IN AN ORGANIZED HEALTH CARE EDUCATION/TRAINING PROGRAM

## 2024-06-24 PROCEDURE — 85025 COMPLETE CBC W/AUTO DIFF WBC: CPT

## 2024-06-24 RX ORDER — ASPIRIN 81 MG/1
81 TABLET ORAL DAILY
Status: DISCONTINUED | OUTPATIENT
Start: 2024-06-25 | End: 2024-06-28 | Stop reason: HOSPADM

## 2024-06-24 RX ORDER — ASPIRIN 81 MG/1
324 TABLET, CHEWABLE ORAL ONCE
Status: COMPLETED | OUTPATIENT
Start: 2024-06-24 | End: 2024-06-24

## 2024-06-24 RX ORDER — ONDANSETRON 2 MG/ML
4 INJECTION INTRAMUSCULAR; INTRAVENOUS EVERY 6 HOURS PRN
Status: DISCONTINUED | OUTPATIENT
Start: 2024-06-24 | End: 2024-06-28 | Stop reason: HOSPADM

## 2024-06-24 RX ORDER — ATORVASTATIN CALCIUM 80 MG/1
80 TABLET, FILM COATED ORAL NIGHTLY
Status: DISCONTINUED | OUTPATIENT
Start: 2024-06-24 | End: 2024-06-26 | Stop reason: SDUPTHER

## 2024-06-24 RX ORDER — POTASSIUM CHLORIDE 20 MEQ/1
40 TABLET, EXTENDED RELEASE ORAL PRN
Status: DISCONTINUED | OUTPATIENT
Start: 2024-06-24 | End: 2024-06-28 | Stop reason: HOSPADM

## 2024-06-24 RX ORDER — HEPARIN SODIUM 1000 [USP'U]/ML
60 INJECTION, SOLUTION INTRAVENOUS; SUBCUTANEOUS ONCE
Status: COMPLETED | OUTPATIENT
Start: 2024-06-24 | End: 2024-06-25

## 2024-06-24 RX ORDER — ATORVASTATIN CALCIUM 80 MG/1
80 TABLET, FILM COATED ORAL NIGHTLY
COMMUNITY

## 2024-06-24 RX ORDER — HEPARIN SODIUM 10000 [USP'U]/100ML
5-30 INJECTION, SOLUTION INTRAVENOUS CONTINUOUS
Status: DISCONTINUED | OUTPATIENT
Start: 2024-06-24 | End: 2024-06-24 | Stop reason: SDUPTHER

## 2024-06-24 RX ORDER — HEPARIN SODIUM 1000 [USP'U]/ML
30 INJECTION, SOLUTION INTRAVENOUS; SUBCUTANEOUS PRN
Status: DISCONTINUED | OUTPATIENT
Start: 2024-06-24 | End: 2024-06-25

## 2024-06-24 RX ORDER — HEPARIN SODIUM 1000 [USP'U]/ML
40 INJECTION, SOLUTION INTRAVENOUS; SUBCUTANEOUS PRN
Status: DISCONTINUED | OUTPATIENT
Start: 2024-06-24 | End: 2024-06-24 | Stop reason: SDUPTHER

## 2024-06-24 RX ORDER — HEPARIN SODIUM 10000 [USP'U]/100ML
5-30 INJECTION, SOLUTION INTRAVENOUS CONTINUOUS
Status: DISCONTINUED | OUTPATIENT
Start: 2024-06-24 | End: 2024-06-25

## 2024-06-24 RX ORDER — MAGNESIUM SULFATE IN WATER 40 MG/ML
2000 INJECTION, SOLUTION INTRAVENOUS PRN
Status: DISCONTINUED | OUTPATIENT
Start: 2024-06-24 | End: 2024-06-28 | Stop reason: HOSPADM

## 2024-06-24 RX ORDER — HEPARIN SODIUM 1000 [USP'U]/ML
80 INJECTION, SOLUTION INTRAVENOUS; SUBCUTANEOUS PRN
Status: DISCONTINUED | OUTPATIENT
Start: 2024-06-24 | End: 2024-06-24 | Stop reason: SDUPTHER

## 2024-06-24 RX ORDER — ONDANSETRON 4 MG/1
4 TABLET, ORALLY DISINTEGRATING ORAL EVERY 8 HOURS PRN
Status: DISCONTINUED | OUTPATIENT
Start: 2024-06-24 | End: 2024-06-28 | Stop reason: HOSPADM

## 2024-06-24 RX ORDER — SODIUM CHLORIDE 9 MG/ML
INJECTION, SOLUTION INTRAVENOUS PRN
Status: DISCONTINUED | OUTPATIENT
Start: 2024-06-24 | End: 2024-06-28 | Stop reason: HOSPADM

## 2024-06-24 RX ORDER — POLYETHYLENE GLYCOL 3350 17 G/17G
17 POWDER, FOR SOLUTION ORAL DAILY PRN
Status: DISCONTINUED | OUTPATIENT
Start: 2024-06-24 | End: 2024-06-28 | Stop reason: HOSPADM

## 2024-06-24 RX ORDER — ACETAMINOPHEN 650 MG/1
650 SUPPOSITORY RECTAL EVERY 6 HOURS PRN
Status: DISCONTINUED | OUTPATIENT
Start: 2024-06-24 | End: 2024-06-28 | Stop reason: HOSPADM

## 2024-06-24 RX ORDER — DILTIAZEM HYDROCHLORIDE 5 MG/ML
10 INJECTION INTRAVENOUS ONCE
Status: COMPLETED | OUTPATIENT
Start: 2024-06-24 | End: 2024-06-24

## 2024-06-24 RX ORDER — ACETAMINOPHEN 325 MG/1
650 TABLET ORAL EVERY 6 HOURS PRN
Status: DISCONTINUED | OUTPATIENT
Start: 2024-06-24 | End: 2024-06-25 | Stop reason: ALTCHOICE

## 2024-06-24 RX ORDER — POTASSIUM CHLORIDE 7.45 MG/ML
10 INJECTION INTRAVENOUS PRN
Status: DISCONTINUED | OUTPATIENT
Start: 2024-06-24 | End: 2024-06-28 | Stop reason: HOSPADM

## 2024-06-24 RX ORDER — HEPARIN SODIUM 1000 [USP'U]/ML
80 INJECTION, SOLUTION INTRAVENOUS; SUBCUTANEOUS ONCE
Status: DISCONTINUED | OUTPATIENT
Start: 2024-06-24 | End: 2024-06-24 | Stop reason: SDUPTHER

## 2024-06-24 RX ORDER — SODIUM CHLORIDE 0.9 % (FLUSH) 0.9 %
5-40 SYRINGE (ML) INJECTION EVERY 12 HOURS SCHEDULED
Status: DISCONTINUED | OUTPATIENT
Start: 2024-06-24 | End: 2024-06-28 | Stop reason: HOSPADM

## 2024-06-24 RX ORDER — ASPIRIN 81 MG
1 TABLET, DELAYED RELEASE (ENTERIC COATED) ORAL NIGHTLY
COMMUNITY

## 2024-06-24 RX ORDER — DILTIAZEM HCL IN NACL,ISO-OSM 125 MG/125
2.5-15 PLASTIC BAG, INJECTION (ML) INTRAVENOUS CONTINUOUS
Status: DISCONTINUED | OUTPATIENT
Start: 2024-06-24 | End: 2024-06-26

## 2024-06-24 RX ORDER — HEPARIN SODIUM 1000 [USP'U]/ML
60 INJECTION, SOLUTION INTRAVENOUS; SUBCUTANEOUS PRN
Status: DISCONTINUED | OUTPATIENT
Start: 2024-06-24 | End: 2024-06-25

## 2024-06-24 RX ORDER — SODIUM CHLORIDE 0.9 % (FLUSH) 0.9 %
5-40 SYRINGE (ML) INJECTION PRN
Status: DISCONTINUED | OUTPATIENT
Start: 2024-06-24 | End: 2024-06-28 | Stop reason: HOSPADM

## 2024-06-24 RX ADMIN — Medication 5 MG/HR: at 19:45

## 2024-06-24 RX ADMIN — SODIUM CHLORIDE, PRESERVATIVE FREE 10 ML: 5 INJECTION INTRAVENOUS at 22:38

## 2024-06-24 RX ADMIN — DILTIAZEM HYDROCHLORIDE 10 MG: 5 INJECTION, SOLUTION INTRAVENOUS at 19:41

## 2024-06-24 RX ADMIN — IOPAMIDOL 75 ML: 755 INJECTION, SOLUTION INTRAVENOUS at 18:28

## 2024-06-24 RX ADMIN — ASPIRIN 243 MG: 81 TABLET, CHEWABLE ORAL at 16:54

## 2024-06-24 RX ADMIN — ATORVASTATIN CALCIUM 80 MG: 80 TABLET, FILM COATED ORAL at 22:37

## 2024-06-24 RX ADMIN — NITROGLYCERIN 0.5 INCH: 20 OINTMENT TOPICAL at 19:24

## 2024-06-24 ASSESSMENT — ENCOUNTER SYMPTOMS
NAUSEA: 0
SHORTNESS OF BREATH: 0
CHEST TIGHTNESS: 0
ABDOMINAL PAIN: 0
DIARRHEA: 0
VOMITING: 0

## 2024-06-24 ASSESSMENT — PAIN DESCRIPTION - PAIN TYPE: TYPE: ACUTE PAIN

## 2024-06-24 ASSESSMENT — PAIN SCALES - GENERAL
PAINLEVEL_OUTOF10: 8
PAINLEVEL_OUTOF10: 0
PAINLEVEL_OUTOF10: 3
PAINLEVEL_OUTOF10: 3

## 2024-06-24 ASSESSMENT — PAIN DESCRIPTION - LOCATION
LOCATION: CHEST
LOCATION: CHEST

## 2024-06-24 ASSESSMENT — PAIN DESCRIPTION - ORIENTATION: ORIENTATION: RIGHT

## 2024-06-24 ASSESSMENT — PAIN - FUNCTIONAL ASSESSMENT: PAIN_FUNCTIONAL_ASSESSMENT: 0-10

## 2024-06-24 ASSESSMENT — PAIN DESCRIPTION - DESCRIPTORS: DESCRIPTORS: ACHING

## 2024-06-24 NOTE — ED PROVIDER NOTES
[facility]URSING  EMERGENCY DEPARTMENT ENCOUNTER        Pt Name: Ruthann Gardner  MRN: [id number]  Birthdate 1960  Date of evaluation: 6/24/2024  Provider: SEUN Sotelo CNP  PCP: Unknown, Provider, APRN - NP  Note Started: 5:08 PM EDT 6/24/24       I have seen and evaluated this patient with my supervising physician dr. souza      CHIEF COMPLAINT       Chief Complaint   Patient presents with    Chest Pain     Reports intermittent right side chest pain radiating to neck and rt arm for 2 weeks.  States if she raises her arm above her head she can get relief from the pain.       HISTORY OF PRESENT ILLNESS: 1 or more Elements     History from : Patient    Limitations to history : None    Ruthann Gardner is a 64 y.o. female who presents to the emergency department with reproducible right-sided chest pain that radiates under the arm and up into the right side of her neck.  Intermittently present x 2 weeks.  Does not recall injury or trauma however she was carrying large cinderblocks just prior to onset of symptoms.  The patient reports that the pain is excruciating and actually awoke her from sleep, she thought maybe she was having a heart attack.  She did take ibuprofen this morning without significant relief of symptoms.  She is an everyday cigarette smoker with high cholesterol, no history of diabetes or hypertension.  The patient reports that she has never had a heart attack.    Denies any headache, fever, lightheadedness, dizziness, visual disturbances.  No neck or back pain.  No shortness of breath, cough, or congestion.  No abdominal pain, nausea, vomiting, diarrhea, constipation, or dysuria.  No rash.    Nursing Notes were all reviewed and agreed with or any disagreements were addressed in the HPI.    REVIEW OF SYSTEMS :      Review of Systems   Constitutional:  Negative for activity change, chills and fever.   Respiratory:  Negative for chest tightness and shortness of breath.   unremarkable.  CMP unremarkable.  .  D-dimer 0.63.    CT CHEST PULMONARY EMBOLISM W CONTRAST (Final result)  Result time 06/24/24 19:12:56  Final result by Diallo Desouza MD (06/24/24 19:12:56)                Impression:    No evidence of pulmonary embolism or acute pulmonary abnormality.    Coronary calcifications.    Emphysema.              Her rhythm did change A-fib with RVR.  I do not see any documented history of atrial fibrillation, the patient does tell me that her heart has history of pickups.  She is placed on Cardizem after given bolus.  She will be admitted for A-fib RVR, likely new as well as CAD rule out especially given coronary calcifications and multiple risk factors with elevated troponin.    Disposition Considerations (include 1 Tests not done, Shared Decision Making, Pt Expectation of Test or Tx.): Shared decision making: Initial differential diagnoses were discussed with this patient, along with physical exam findings and an explanation what evaluation studies were necessary and why. Labs and Imaging results were explained to the patient in detail, including explanation of what these results mean. All treatment and disposition options were discussed with the patient and a treatment plan with the patient's best short and long term care was made in collaboration with the patient.    I did consider ct cardiac    admit      I am the Primary Clinician of Record.    FINAL IMPRESSION      1. Atrial fibrillation with RVR (HCC)    2. Intermittent right-sided chest pain    3. Smoker    4. Atrial fibrillation, unspecified type (HCC)          DISPOSITION/PLAN     DISPOSITION Admitted 06/24/2024 08:41:32 PM      PATIENT REFERRED TO:  No follow-up provider specified.    DISCHARGE MEDICATIONS:  Current Discharge Medication List          DISCONTINUED MEDICATIONS:  Current Discharge Medication List                 (Please note that portions of this note were completed with a voice recognition program.

## 2024-06-25 ENCOUNTER — APPOINTMENT (OUTPATIENT)
Age: 64
End: 2024-06-25
Attending: STUDENT IN AN ORGANIZED HEALTH CARE EDUCATION/TRAINING PROGRAM
Payer: COMMERCIAL

## 2024-06-25 ENCOUNTER — HOSPITAL ENCOUNTER (INPATIENT)
Age: 64
Discharge: HOME | End: 2024-06-27
Payer: COMMERCIAL

## 2024-06-25 ENCOUNTER — APPOINTMENT (OUTPATIENT)
Age: 64
End: 2024-06-25
Payer: COMMERCIAL

## 2024-06-25 VITALS
HEIGHT: 68 IN | SYSTOLIC BLOOD PRESSURE: 132 MMHG | WEIGHT: 130 LBS | HEART RATE: 57 BPM | BODY MASS INDEX: 19.7 KG/M2 | DIASTOLIC BLOOD PRESSURE: 76 MMHG

## 2024-06-25 DIAGNOSIS — I25.10 CORONARY ARTERY DISEASE DUE TO LIPID RICH PLAQUE: Primary | ICD-10-CM

## 2024-06-25 DIAGNOSIS — I25.83 CORONARY ARTERY DISEASE DUE TO LIPID RICH PLAQUE: Primary | ICD-10-CM

## 2024-06-25 PROBLEM — R07.89 INTERMITTENT RIGHT-SIDED CHEST PAIN: Status: ACTIVE | Noted: 2024-06-25

## 2024-06-25 PROBLEM — F17.200 SMOKER: Status: ACTIVE | Noted: 2024-06-25

## 2024-06-25 LAB
ANION GAP SERPL CALCULATED.3IONS-SCNC: 10 MMOL/L (ref 3–16)
ANTI-XA UNFRAC HEPARIN: <0.1 IU/ML (ref 0.3–0.7)
APTT BLD: 29.3 SEC (ref 22.1–36.4)
BASOPHILS # BLD: 0.1 K/UL (ref 0–0.2)
BASOPHILS NFR BLD: 2.2 %
BUN SERPL-MCNC: 10 MG/DL (ref 7–20)
CALCIUM SERPL-MCNC: 8.9 MG/DL (ref 8.3–10.6)
CHLORIDE SERPL-SCNC: 109 MMOL/L (ref 99–110)
CO2 SERPL-SCNC: 23 MMOL/L (ref 21–32)
CREAT SERPL-MCNC: 0.7 MG/DL (ref 0.6–1.2)
DEPRECATED RDW RBC AUTO: 16.1 % (ref 12.4–15.4)
ECHO AO ASC DIAM: 3.5 CM
ECHO AO ASCENDING AORTA INDEX: 2.06 CM/M2
ECHO AO ROOT DIAM: 3.9 CM
ECHO AO ROOT INDEX: 2.29 CM/M2
ECHO AV AREA PEAK VELOCITY: 2.1 CM2
ECHO AV AREA VTI: 2 CM2
ECHO AV AREA/BSA PEAK VELOCITY: 1.2 CM2/M2
ECHO AV AREA/BSA VTI: 1.2 CM2/M2
ECHO AV MEAN GRADIENT: 3 MMHG
ECHO AV MEAN VELOCITY: 0.9 M/S
ECHO AV PEAK GRADIENT: 6 MMHG
ECHO AV PEAK VELOCITY: 1.2 M/S
ECHO AV VELOCITY RATIO: 0.67
ECHO AV VTI: 28.5 CM
ECHO BSA: 1.68 M2
ECHO BSA: 1.68 M2
ECHO EST RA PRESSURE: 15 MMHG
ECHO IVC PROX: 2.6 CM
ECHO LA AREA 2C: 15.9 CM2
ECHO LA AREA 4C: 15.3 CM2
ECHO LA DIAMETER INDEX: 1.76 CM/M2
ECHO LA DIAMETER: 3 CM
ECHO LA MAJOR AXIS: 4.5 CM
ECHO LA MINOR AXIS: 4.3 CM
ECHO LA TO AORTIC ROOT RATIO: 0.77
ECHO LA VOL BP: 43 ML (ref 22–52)
ECHO LA VOL MOD A2C: 48 ML (ref 22–52)
ECHO LA VOL MOD A4C: 37 ML (ref 22–52)
ECHO LA VOL/BSA BIPLANE: 25 ML/M2 (ref 16–34)
ECHO LA VOLUME INDEX MOD A2C: 28 ML/M2 (ref 16–34)
ECHO LA VOLUME INDEX MOD A4C: 22 ML/M2 (ref 16–34)
ECHO LV E' LATERAL VELOCITY: 10 CM/S
ECHO LV E' SEPTAL VELOCITY: 9 CM/S
ECHO LV EDV A2C: 98 ML
ECHO LV EDV A4C: 87 ML
ECHO LV EDV INDEX A4C: 51 ML/M2
ECHO LV EDV NDEX A2C: 58 ML/M2
ECHO LV EJECTION FRACTION A2C: 54 %
ECHO LV EJECTION FRACTION A4C: 53 %
ECHO LV EJECTION FRACTION BIPLANE: 51 % (ref 55–100)
ECHO LV ESV A2C: 45 ML
ECHO LV ESV A4C: 41 ML
ECHO LV ESV INDEX A2C: 26 ML/M2
ECHO LV ESV INDEX A4C: 24 ML/M2
ECHO LV FRACTIONAL SHORTENING: 22 % (ref 28–44)
ECHO LV INTERNAL DIMENSION DIASTOLE INDEX: 2.65 CM/M2
ECHO LV INTERNAL DIMENSION DIASTOLIC: 4.5 CM (ref 3.9–5.3)
ECHO LV INTERNAL DIMENSION SYSTOLIC INDEX: 2.06 CM/M2
ECHO LV INTERNAL DIMENSION SYSTOLIC: 3.5 CM
ECHO LV IVSD: 0.9 CM (ref 0.6–0.9)
ECHO LV MASS 2D: 153.3 G (ref 67–162)
ECHO LV MASS INDEX 2D: 90.2 G/M2 (ref 43–95)
ECHO LV POSTERIOR WALL DIASTOLIC: 1.1 CM (ref 0.6–0.9)
ECHO LV RELATIVE WALL THICKNESS RATIO: 0.49
ECHO LVOT AREA: 3.1 CM2
ECHO LVOT AV VTI INDEX: 0.64
ECHO LVOT DIAM: 2 CM
ECHO LVOT MEAN GRADIENT: 1 MMHG
ECHO LVOT PEAK GRADIENT: 3 MMHG
ECHO LVOT PEAK VELOCITY: 0.8 M/S
ECHO LVOT STROKE VOLUME INDEX: 33.4 ML/M2
ECHO LVOT SV: 56.8 ML
ECHO LVOT VTI: 18.1 CM
ECHO MV A VELOCITY: 0.61 M/S
ECHO MV AREA VTI: 1.5 CM2
ECHO MV E DECELERATION TIME (DT): 264 MS
ECHO MV E VELOCITY: 1.18 M/S
ECHO MV E/A RATIO: 1.93
ECHO MV E/E' LATERAL: 11.8
ECHO MV E/E' RATIO (AVERAGED): 12.46
ECHO MV E/E' SEPTAL: 13.11
ECHO MV LVOT VTI INDEX: 2.06
ECHO MV MAX VELOCITY: 1.2 M/S
ECHO MV MEAN GRADIENT: 2 MMHG
ECHO MV MEAN VELOCITY: 0.6 M/S
ECHO MV PEAK GRADIENT: 5 MMHG
ECHO MV REGURGITANT PEAK GRADIENT: 36 MMHG
ECHO MV REGURGITANT PEAK VELOCITY: 3 M/S
ECHO MV VTI: 37.3 CM
ECHO PV MAX VELOCITY: 0.7 M/S
ECHO PV PEAK GRADIENT: 2 MMHG
ECHO RA AREA 4C: 17.7 CM2
ECHO RA END SYSTOLIC VOLUME APICAL 4 CHAMBER INDEX BSA: 29 ML/M2
ECHO RA VOLUME: 50 ML
ECHO RIGHT VENTRICULAR SYSTOLIC PRESSURE (RVSP): 40 MMHG
ECHO RV BASAL DIMENSION: 3.2 CM
ECHO RV FREE WALL PEAK S': 11 CM/S
ECHO RV LONGITUDINAL DIMENSION: 5.4 CM
ECHO RV MID DIMENSION: 2.1 CM
ECHO RV TAPSE: 2.2 CM (ref 1.7–?)
ECHO TV REGURGITANT MAX VELOCITY: 2.52 M/S
ECHO TV REGURGITANT PEAK GRADIENT: 25 MMHG
EKG ATRIAL RATE: 61 BPM
EKG DIAGNOSIS: NORMAL
EKG DIAGNOSIS: NORMAL
EKG P AXIS: 39 DEGREES
EKG P-R INTERVAL: 154 MS
EKG Q-T INTERVAL: 326 MS
EKG Q-T INTERVAL: 410 MS
EKG QRS DURATION: 84 MS
EKG QRS DURATION: 88 MS
EKG QTC CALCULATION (BAZETT): 412 MS
EKG QTC CALCULATION (BAZETT): 472 MS
EKG R AXIS: 52 DEGREES
EKG R AXIS: 55 DEGREES
EKG T AXIS: -27 DEGREES
EKG T AXIS: -5 DEGREES
EKG VENTRICULAR RATE: 126 BPM
EKG VENTRICULAR RATE: 61 BPM
EOSINOPHIL # BLD: 0.9 K/UL (ref 0–0.6)
EOSINOPHIL NFR BLD: 16.6 %
GFR SERPLBLD CREATININE-BSD FMLA CKD-EPI: >90 ML/MIN/{1.73_M2}
GLUCOSE SERPL-MCNC: 96 MG/DL (ref 70–99)
HCT VFR BLD AUTO: 33.1 % (ref 36–48)
HGB BLD-MCNC: 11 G/DL (ref 12–16)
INR PPP: 0.98 (ref 0.85–1.15)
LYMPHOCYTES # BLD: 2.4 K/UL (ref 1–5.1)
LYMPHOCYTES NFR BLD: 42.9 %
MAGNESIUM SERPL-MCNC: 2 MG/DL (ref 1.8–2.4)
MCH RBC QN AUTO: 27.1 PG (ref 26–34)
MCHC RBC AUTO-ENTMCNC: 33.2 G/DL (ref 31–36)
MCV RBC AUTO: 81.4 FL (ref 80–100)
MONOCYTES # BLD: 0.5 K/UL (ref 0–1.3)
MONOCYTES NFR BLD: 8 %
NEUTROPHILS # BLD: 1.7 K/UL (ref 1.7–7.7)
NEUTROPHILS NFR BLD: 30.3 %
NUC STRESS EJECTION FRACTION: 57 %
NUC STRESS LV EDV: 112 ML (ref 56–104)
NUC STRESS LV ESV: 48 ML (ref 19–49)
NUC STRESS LV MASS: 143 G
PLATELET # BLD AUTO: 272 K/UL (ref 135–450)
PMV BLD AUTO: 8.8 FL (ref 5–10.5)
POC ACT LR: 217 SEC
POC ACT LR: 368 SEC
POTASSIUM SERPL-SCNC: 3.9 MMOL/L (ref 3.5–5.1)
POTASSIUM SERPL-SCNC: 3.9 MMOL/L (ref 3.5–5.1)
PROTHROMBIN TIME: 13.2 SEC (ref 11.9–14.9)
RBC # BLD AUTO: 4.06 M/UL (ref 4–5.2)
SODIUM SERPL-SCNC: 142 MMOL/L (ref 136–145)
STRESS BASELINE DIAS BP: 76 MMHG
STRESS BASELINE HR: 57 BPM
STRESS BASELINE SYS BP: 132 MMHG
STRESS ESTIMATED WORKLOAD: 6.9 METS
STRESS EXERCISE DUR MIN: 6 MIN
STRESS EXERCISE DUR SEC: 0 SEC
STRESS O2 SAT PEAK: 95 %
STRESS O2 SAT REST: 95 %
STRESS PEAK DIAS BP: 80 MMHG
STRESS PEAK SYS BP: 149 MMHG
STRESS PERCENT HR ACHIEVED: 90 %
STRESS POST PEAK HR: 142 BPM
STRESS RATE PRESSURE PRODUCT: ABNORMAL BPM*MMHG
STRESS ST DEPRESSION: 3 MM
STRESS TARGET HR: 157 BPM
TID: 1.07
TSH SERPL DL<=0.005 MIU/L-ACNC: 1.16 UIU/ML (ref 0.27–4.2)
WBC # BLD AUTO: 5.7 K/UL (ref 4–11)

## 2024-06-25 PROCEDURE — B2151ZZ FLUOROSCOPY OF LEFT HEART USING LOW OSMOLAR CONTRAST: ICD-10-PCS | Performed by: INTERNAL MEDICINE

## 2024-06-25 PROCEDURE — C1887 CATHETER, GUIDING: HCPCS | Performed by: INTERNAL MEDICINE

## 2024-06-25 PROCEDURE — 93458 L HRT ARTERY/VENTRICLE ANGIO: CPT

## 2024-06-25 PROCEDURE — 027034Z DILATION OF CORONARY ARTERY, ONE ARTERY WITH DRUG-ELUTING INTRALUMINAL DEVICE, PERCUTANEOUS APPROACH: ICD-10-PCS | Performed by: INTERNAL MEDICINE

## 2024-06-25 PROCEDURE — 93010 ELECTROCARDIOGRAM REPORT: CPT | Performed by: INTERNAL MEDICINE

## 2024-06-25 PROCEDURE — 3430000000 HC RX DIAGNOSTIC RADIOPHARMACEUTICAL: Performed by: INTERNAL MEDICINE

## 2024-06-25 PROCEDURE — C1894 INTRO/SHEATH, NON-LASER: HCPCS | Performed by: INTERNAL MEDICINE

## 2024-06-25 PROCEDURE — 93017 CV STRESS TEST TRACING ONLY: CPT

## 2024-06-25 PROCEDURE — 83735 ASSAY OF MAGNESIUM: CPT

## 2024-06-25 PROCEDURE — 93458 L HRT ARTERY/VENTRICLE ANGIO: CPT | Performed by: INTERNAL MEDICINE

## 2024-06-25 PROCEDURE — B2111ZZ FLUOROSCOPY OF MULTIPLE CORONARY ARTERIES USING LOW OSMOLAR CONTRAST: ICD-10-PCS | Performed by: INTERNAL MEDICINE

## 2024-06-25 PROCEDURE — 6360000002 HC RX W HCPCS: Performed by: STUDENT IN AN ORGANIZED HEALTH CARE EDUCATION/TRAINING PROGRAM

## 2024-06-25 PROCEDURE — 4A023N7 MEASUREMENT OF CARDIAC SAMPLING AND PRESSURE, LEFT HEART, PERCUTANEOUS APPROACH: ICD-10-PCS | Performed by: INTERNAL MEDICINE

## 2024-06-25 PROCEDURE — 99152 MOD SED SAME PHYS/QHP 5/>YRS: CPT | Performed by: INTERNAL MEDICINE

## 2024-06-25 PROCEDURE — 93306 TTE W/DOPPLER COMPLETE: CPT

## 2024-06-25 PROCEDURE — 2500000003 HC RX 250 WO HCPCS

## 2024-06-25 PROCEDURE — 6370000000 HC RX 637 (ALT 250 FOR IP): Performed by: STUDENT IN AN ORGANIZED HEALTH CARE EDUCATION/TRAINING PROGRAM

## 2024-06-25 PROCEDURE — 84132 ASSAY OF SERUM POTASSIUM: CPT

## 2024-06-25 PROCEDURE — 2500000003 HC RX 250 WO HCPCS: Performed by: INTERNAL MEDICINE

## 2024-06-25 PROCEDURE — 6360000002 HC RX W HCPCS

## 2024-06-25 PROCEDURE — 2580000003 HC RX 258: Performed by: INTERNAL MEDICINE

## 2024-06-25 PROCEDURE — 78452 HT MUSCLE IMAGE SPECT MULT: CPT | Performed by: INTERNAL MEDICINE

## 2024-06-25 PROCEDURE — C1874 STENT, COATED/COV W/DEL SYS: HCPCS | Performed by: INTERNAL MEDICINE

## 2024-06-25 PROCEDURE — 92928 PRQ TCAT PLMT NTRAC ST 1 LES: CPT

## 2024-06-25 PROCEDURE — 6360000002 HC RX W HCPCS: Performed by: INTERNAL MEDICINE

## 2024-06-25 PROCEDURE — 80048 BASIC METABOLIC PNL TOTAL CA: CPT

## 2024-06-25 PROCEDURE — 33967 INSERT I-AORT PERCUT DEVICE: CPT | Performed by: INTERNAL MEDICINE

## 2024-06-25 PROCEDURE — 6370000000 HC RX 637 (ALT 250 FOR IP): Performed by: INTERNAL MEDICINE

## 2024-06-25 PROCEDURE — 2580000003 HC RX 258: Performed by: STUDENT IN AN ORGANIZED HEALTH CARE EDUCATION/TRAINING PROGRAM

## 2024-06-25 PROCEDURE — 51702 INSERT TEMP BLADDER CATH: CPT

## 2024-06-25 PROCEDURE — 99223 1ST HOSP IP/OBS HIGH 75: CPT | Performed by: INTERNAL MEDICINE

## 2024-06-25 PROCEDURE — 6360000004 HC RX CONTRAST MEDICATION: Performed by: INTERNAL MEDICINE

## 2024-06-25 PROCEDURE — 85025 COMPLETE CBC W/AUTO DIFF WBC: CPT

## 2024-06-25 PROCEDURE — 2100000000 HC CCU R&B

## 2024-06-25 PROCEDURE — 2709999900 HC NON-CHARGEABLE SUPPLY: Performed by: INTERNAL MEDICINE

## 2024-06-25 PROCEDURE — 93306 TTE W/DOPPLER COMPLETE: CPT | Performed by: INTERNAL MEDICINE

## 2024-06-25 PROCEDURE — 93018 CV STRESS TEST I&R ONLY: CPT | Performed by: INTERNAL MEDICINE

## 2024-06-25 PROCEDURE — 93005 ELECTROCARDIOGRAM TRACING: CPT | Performed by: INTERNAL MEDICINE

## 2024-06-25 PROCEDURE — C1769 GUIDE WIRE: HCPCS | Performed by: INTERNAL MEDICINE

## 2024-06-25 PROCEDURE — 33967 INSERT I-AORT PERCUT DEVICE: CPT

## 2024-06-25 PROCEDURE — 78452 HT MUSCLE IMAGE SPECT MULT: CPT

## 2024-06-25 PROCEDURE — C1725 CATH, TRANSLUMIN NON-LASER: HCPCS | Performed by: INTERNAL MEDICINE

## 2024-06-25 PROCEDURE — 99153 MOD SED SAME PHYS/QHP EA: CPT | Performed by: INTERNAL MEDICINE

## 2024-06-25 PROCEDURE — 92928 PRQ TCAT PLMT NTRAC ST 1 LES: CPT | Performed by: INTERNAL MEDICINE

## 2024-06-25 PROCEDURE — 2000000000 HC ICU R&B

## 2024-06-25 PROCEDURE — 36415 COLL VENOUS BLD VENIPUNCTURE: CPT

## 2024-06-25 PROCEDURE — A9502 TC99M TETROFOSMIN: HCPCS | Performed by: INTERNAL MEDICINE

## 2024-06-25 PROCEDURE — 2580000003 HC RX 258

## 2024-06-25 PROCEDURE — 85347 COAGULATION TIME ACTIVATED: CPT

## 2024-06-25 PROCEDURE — 93016 CV STRESS TEST SUPVJ ONLY: CPT | Performed by: INTERNAL MEDICINE

## 2024-06-25 PROCEDURE — 5A02210 ASSISTANCE WITH CARDIAC OUTPUT USING BALLOON PUMP, CONTINUOUS: ICD-10-PCS | Performed by: INTERNAL MEDICINE

## 2024-06-25 DEVICE — XIENCE SIERRA™ EVEROLIMUS ELUTING CORONARY STENT SYSTEM 3.00 MM X 15 MM / RAPID-EXCHANGE
Type: IMPLANTABLE DEVICE | Status: FUNCTIONAL
Brand: XIENCE SIERRA™

## 2024-06-25 RX ORDER — HEPARIN SODIUM 5000 [USP'U]/ML
5000 INJECTION, SOLUTION INTRAVENOUS; SUBCUTANEOUS EVERY 8 HOURS SCHEDULED
Status: DISCONTINUED | OUTPATIENT
Start: 2024-06-25 | End: 2024-06-26

## 2024-06-25 RX ORDER — EPTIFIBATIDE 0.75 MG/ML
2 INJECTION, SOLUTION INTRAVENOUS CONTINUOUS
Status: DISPENSED | OUTPATIENT
Start: 2024-06-25 | End: 2024-06-26

## 2024-06-25 RX ORDER — EPTIFIBATIDE 0.75 MG/ML
INJECTION, SOLUTION INTRAVENOUS CONTINUOUS PRN
Status: COMPLETED | OUTPATIENT
Start: 2024-06-25 | End: 2024-06-25

## 2024-06-25 RX ORDER — ASPIRIN 81 MG/1
81 TABLET, CHEWABLE ORAL DAILY
Status: DISCONTINUED | OUTPATIENT
Start: 2024-06-26 | End: 2024-06-25 | Stop reason: SDUPTHER

## 2024-06-25 RX ORDER — ATORVASTATIN CALCIUM 80 MG/1
80 TABLET, FILM COATED ORAL NIGHTLY
Status: DISCONTINUED | OUTPATIENT
Start: 2024-06-25 | End: 2024-06-28 | Stop reason: HOSPADM

## 2024-06-25 RX ORDER — MIDAZOLAM HYDROCHLORIDE 1 MG/ML
INJECTION INTRAMUSCULAR; INTRAVENOUS PRN
Status: DISCONTINUED | OUTPATIENT
Start: 2024-06-25 | End: 2024-06-25 | Stop reason: HOSPADM

## 2024-06-25 RX ORDER — NITROGLYCERIN 20 MG/100ML
INJECTION INTRAVENOUS CONTINUOUS PRN
Status: COMPLETED | OUTPATIENT
Start: 2024-06-25 | End: 2024-06-25

## 2024-06-25 RX ORDER — HEPARIN SODIUM 1000 [USP'U]/ML
INJECTION, SOLUTION INTRAVENOUS; SUBCUTANEOUS PRN
Status: DISCONTINUED | OUTPATIENT
Start: 2024-06-25 | End: 2024-06-25 | Stop reason: HOSPADM

## 2024-06-25 RX ORDER — ACETAMINOPHEN 325 MG/1
650 TABLET ORAL EVERY 4 HOURS PRN
Status: DISCONTINUED | OUTPATIENT
Start: 2024-06-25 | End: 2024-06-28 | Stop reason: HOSPADM

## 2024-06-25 RX ORDER — SODIUM CHLORIDE 0.9 % (FLUSH) 0.9 %
5-40 SYRINGE (ML) INJECTION EVERY 12 HOURS SCHEDULED
Status: DISCONTINUED | OUTPATIENT
Start: 2024-06-25 | End: 2024-06-28 | Stop reason: HOSPADM

## 2024-06-25 RX ORDER — SODIUM CHLORIDE 9 MG/ML
INJECTION, SOLUTION INTRAVENOUS PRN
Status: DISCONTINUED | OUTPATIENT
Start: 2024-06-25 | End: 2024-06-28 | Stop reason: HOSPADM

## 2024-06-25 RX ORDER — SODIUM CHLORIDE 0.9 % (FLUSH) 0.9 %
5-40 SYRINGE (ML) INJECTION PRN
Status: DISCONTINUED | OUTPATIENT
Start: 2024-06-25 | End: 2024-06-28 | Stop reason: HOSPADM

## 2024-06-25 RX ORDER — LIDOCAINE HYDROCHLORIDE 10 MG/ML
INJECTION, SOLUTION INFILTRATION; PERINEURAL PRN
Status: DISCONTINUED | OUTPATIENT
Start: 2024-06-25 | End: 2024-06-25 | Stop reason: HOSPADM

## 2024-06-25 RX ORDER — EPTIFIBATIDE 2 MG/ML
INJECTION, SOLUTION INTRAVENOUS PRN
Status: DISCONTINUED | OUTPATIENT
Start: 2024-06-25 | End: 2024-06-25 | Stop reason: HOSPADM

## 2024-06-25 RX ORDER — NITROGLYCERIN 20 MG/100ML
5-200 INJECTION INTRAVENOUS CONTINUOUS
Status: DISCONTINUED | OUTPATIENT
Start: 2024-06-26 | End: 2024-06-28 | Stop reason: HOSPADM

## 2024-06-25 RX ADMIN — SODIUM CHLORIDE, PRESERVATIVE FREE 10 ML: 5 INJECTION INTRAVENOUS at 21:48

## 2024-06-25 RX ADMIN — EPTIFIBATIDE 2 MCG/KG/MIN: 0.75 INJECTION INTRAVENOUS at 20:30

## 2024-06-25 RX ADMIN — HEPARIN SODIUM 3600 UNITS: 1000 INJECTION INTRAVENOUS; SUBCUTANEOUS at 00:10

## 2024-06-25 RX ADMIN — TETROFOSMIN 11.4 MILLICURIE: 1.38 INJECTION, POWDER, LYOPHILIZED, FOR SOLUTION INTRAVENOUS at 11:36

## 2024-06-25 RX ADMIN — EPTIFIBATIDE 2 MCG/KG/MIN: 0.75 INJECTION INTRAVENOUS at 21:49

## 2024-06-25 RX ADMIN — ASPIRIN 81 MG: 81 TABLET, COATED ORAL at 08:14

## 2024-06-25 RX ADMIN — HEPARIN SODIUM 12 UNITS/KG/HR: 10000 INJECTION, SOLUTION INTRAVENOUS at 00:14

## 2024-06-25 RX ADMIN — ATORVASTATIN CALCIUM 80 MG: 80 TABLET, FILM COATED ORAL at 20:30

## 2024-06-25 RX ADMIN — METOPROLOL TARTRATE 25 MG: 25 TABLET, FILM COATED ORAL at 20:29

## 2024-06-25 RX ADMIN — SODIUM CHLORIDE, PRESERVATIVE FREE 10 ML: 5 INJECTION INTRAVENOUS at 08:18

## 2024-06-25 RX ADMIN — TETROFOSMIN 31 MILLICURIE: 1.38 INJECTION, POWDER, LYOPHILIZED, FOR SOLUTION INTRAVENOUS at 13:30

## 2024-06-25 RX ADMIN — ACETAMINOPHEN 650 MG: 325 TABLET ORAL at 20:29

## 2024-06-25 ASSESSMENT — PAIN SCALES - GENERAL
PAINLEVEL_OUTOF10: 0
PAINLEVEL_OUTOF10: 3
PAINLEVEL_OUTOF10: 0

## 2024-06-25 ASSESSMENT — PAIN DESCRIPTION - LOCATION: LOCATION: BACK

## 2024-06-25 ASSESSMENT — PAIN DESCRIPTION - DESCRIPTORS: DESCRIPTORS: ACHING

## 2024-06-25 ASSESSMENT — PAIN DESCRIPTION - ORIENTATION: ORIENTATION: LOWER

## 2024-06-25 NOTE — ED NOTES
How does patient ambulate?   [x]Low Fall Risk (ambulates by themselves without support)  []Stand by assist   []Contact Guard   []Front wheel walker  []Wheelchair   []Steady  []Bed bound  []History of Lower Extremity Amputation  []Unknown, did not assess in the emergency department   How does patient take pills?  [x]Whole with Water  []Crushed in applesauce  []Crushed in pudding  []Other  []Unknown no oral medications were given in the ED  Is patient alert?   [x]Alert  []Drowsy but responds to voice  []Doesn't respond to voice but responds to painful stimuli  []Unresponsive  Is patient oriented?   [x]To person  [x]To place  [x]To time  [x]To situation  []Confused  []Agitated  [x]Follows commands  If patient is disoriented or from a Skill Nursing Facility has family been notified of admission?   []Yes   []No  Patient belongings?   [x]Cell phone  []Wallet   []Dentures  [x]Clothing  Any specific patient or family belongings/needs/dynamics?    at bedside  Miscellaneous comments/pending orders?  See admit orders      If there are any additional questions please reach out to the Emergency Department.

## 2024-06-25 NOTE — PROCEDURES
PROCEDURE NOTE  Date: 6/25/2024   Name: Ruthann Gardner  YOB: 1960    Procedures    LHC with PCI    OM1 3.0 x 15 BRITTANY    LAD normal     LCX 99%    RCA     Continue IABP   DAPT

## 2024-06-25 NOTE — PROGRESS NOTES
[facility]    6/25/2024 1:22 PM        Name: Ruthann Gardner .              Admitted: 6/24/2024  Primary Care Provider: Unknown, Provider, APRN - NP (Tel: None)      Chief complaint: 65 yo female presented with chest pain. Admitted with atrial fib with RVR.    Subjective:  Resting in bed. Denies chest pain, shortness of breath. Remains in sinus rhythm. Stress test abnormal, [facility] planned this afternoon.    Reviewed interval ancillary notes    Current Medications  heparin (porcine) injection 5,000 Units, 3 times per day  dilTIAZem HCl-Sodium Chloride 125 mg / 125 mL infusion, Continuous  aspirin EC tablet 81 mg, Daily  atorvastatin (LIPITOR) tablet 80 mg, Nightly  sodium chloride flush 0.9 % injection 5-40 mL, 2 times per day  sodium chloride flush 0.9 % injection 5-40 mL, PRN  0.9 % sodium chloride infusion, PRN  potassium chloride (KLOR-CON M) extended release tablet 40 mEq, PRN   Or  potassium bicarb-citric acid (EFFER-K) effervescent tablet 40 mEq, PRN   Or  potassium chloride 10 mEq/100 mL IVPB (Peripheral Line), PRN  magnesium sulfate 2000 mg in 50 mL IVPB premix, PRN  ondansetron (ZOFRAN-ODT) disintegrating tablet 4 mg, Q8H PRN   Or  ondansetron (ZOFRAN) injection 4 mg, Q6H PRN  polyethylene glycol (GLYCOLAX) packet 17 g, Daily PRN  acetaminophen (TYLENOL) tablet 650 mg, Q6H PRN   Or  acetaminophen (TYLENOL) suppository 650 mg, Q6H PRN      Objective:  /77   Pulse 59   Temp 97.7 °F (36.5 °C) (Oral)   Resp 16   Ht 1.727 m (5' 8\")   Wt 59 kg (130 lb)   SpO2 96%   BMI 19.77 kg/m²     Intake/Output Summary (Last 24 hours) at 6/25/2024 1322  Last data filed at 6/25/2024 0814  Gross per 24 hour   Intake 269.46 ml   Output --   Net 269.46 ml      Wt Readings from Last 3 Encounters:   06/25/24 59 kg (130 lb)   06/25/24 59 kg (130 lb)   12/14/22 55.8 kg (123 lb)       General appearance:  Appears

## 2024-06-25 NOTE — H&P
V2.0  History and Physical      Name:  Ruthann Gardner /Age/Sex: 1960  (64 y.o. female)   MRN & CSN:  [id number] & [id number] Encounter Date/Time: 2024 8:41 PM EDT   Location:  [facility]3318/3318-01 PCP: Unknown, Provider, SEUN - NP       Hospital Day: 2    Assessment and Plan:   Ruthann Gardner is a 64 y.o. female with a pmh of hyperlipidemia, iron deficiency anemia who presents with Atrial fibrillation with RVR (HCC)    Hospital Problems             Last Modified POA    * (Principal) Atrial fibrillation with RVR (HCC) 2024 Yes       Plan:  # A-fib with RVR:  -Patient presented with chief complaint of chest pain.  -In the ED,  patient was tachycardic.  EKG was done and showed A-fib with RVR.  -CT chest was done and showed Coronary calcifications. Emphysema.  -Patient was started on dilt drip.  Continue dilt drip.  -LWD1QY4-GQRo score 1  -Aspirin  -Echo  -TSH  -Cardiology consult    # Hyperlipidemia:  -Continue home statin    # Iron deficiency anemia:  -Continue home iron    # Tobacco use disorder:  -Advised on smoking cessation      Disposition:   Current Living situation: Home  Expected Disposition: Home  Estimated D/C: 2 to 3 days    Diet ADULT DIET; Regular; Low Sodium (2 gm)   DVT Prophylaxis [] Lovenox, [x]  Heparin, [] SCDs, [] Ambulation,  [] Eliquis, [] Xarelto, [] Coumadin   Code Status Full Code   Surrogate Decision Maker/ POA      Personally reviewed Lab Studies and Imaging     Discussed management of the case with ED attending who recommended to admit patient for A-fib with RVR.    EKG interpreted personally and results A-fib with RVR.    Imaging that was interpreted personally includes CT chest and results as above.    Drugs that require monitoring for toxicity include heparin and the method of monitoring was Daily CBC.        History from:     patient    History of Present Illness:     Chief Complaint: Chest pain  Ruthann Gardner is a 64 y.o. female with pmh of  Lungs/pleura: No focal consolidation, pleural effusion, or pneumothorax. Emphysema. Upper Abdomen: Limited images of the upper abdomen are unremarkable. Soft Tissues/Bones: No acute osseous abnormality.     No evidence of pulmonary embolism or acute pulmonary abnormality. Coronary calcifications. Emphysema.     XR CHEST PORTABLE    Result Date: 6/24/2024  EXAMINATION: ONE XRAY VIEW OF THE CHEST 6/24/2024 4:49 pm COMPARISON: 07/22/2017 HISTORY: ORDERING SYSTEM PROVIDED HISTORY: SOB TECHNOLOGIST PROVIDED HISTORY: Reason for exam:->SOB Reason for Exam: SOB FINDINGS: Heart size is normal  Aorta is normal.  Lungs are normally expanded. Apparent perihilar opacities which may represent overlapping shadows..  No pleural effusions. Mild spondylosis     Questionable perihilar opacities.  Frontal and lateral views of the chest would be helpful         This note was likely completed using voice recognition technology and may contain unintended errors.     Electronically signed by Sanjana Holloway MD on 6/25/2024 at 2:02 AM

## 2024-06-25 NOTE — H&P
[facility]  Cardiology Consult    Ruthann Gardner  1960    June 25, 2024    CC: CP      Subjective:     History of Present Illness:    Ruthann Gardner is a 64 y.o. patient with a PMH significant for Anemia AFIB presented with complaints of CP.       Past Medical History:   has a past medical history of Anemia, Anemia, Atrial fibrillation with RVR (HCC), Coronary artery calcification seen on computed tomography, Headache, Hyperlipidemia, and Infusion extravasation of non-chemotherapy vesicant.    Surgical History:   has a past surgical history that includes Appendectomy and Colonoscopy (N/A, 12/14/2022).     Social History:   reports that she has been smoking cigarettes. She started smoking about 46 years ago. She has a 11.6 pack-year smoking history. She has never used smokeless tobacco. She reports current alcohol use. She reports that she does not use drugs.     Family History:  family history includes Anemia in her mother; Cancer in her father; Diabetes in her father and mother; Heart Disease in her father and mother; Kidney Cancer in her mother; Stroke in her mother.    Home Medications:  Were reviewed and are listed in nursing record and/or below  Prior to Admission medications    Medication Sig Start Date End Date Taking? Authorizing Provider   atorvastatin (LIPITOR) 80 MG tablet Take 1 tablet by mouth nightly   Yes Otilio Stuart MD   calcium carb-cholecalciferol (CALCIUM 600+D3) 600-5 MG-MCG TABS tablet Take 1 tablet by mouth nightly   Yes Otilio Stuart MD   ferrous gluconate (FERGON) 324 (38 Fe) MG tablet Take 1 tablet by mouth Every Monday, Wednesday, and Friday    Otilio Stuart MD   Multiple Vitamins-Minerals (MULTIVITAL PO) Take 1 tablet by mouth daily    Otilio Stuart MD   aspirin 81 MG tablet Take 1 tablet by mouth daily    Otilio Stuart MD   vitamin B-12 (CYANOCOBALAMIN) 100 MCG tablet Take 1 tablet by mouth daily 7/24/17 6/24/24  Giacomo

## 2024-06-25 NOTE — PROGRESS NOTES
Pharmacy Home Medication Reconciliation Note    A medication reconciliation has been completed for Ruthann Gardner 1960    [facility] [place], [place]  Information provided by: patient    The patient's home medication list is as follows:  No current facility-administered medications on file prior to encounter.     Current Outpatient Medications on File Prior to Encounter   Medication Sig Dispense Refill    atorvastatin (LIPITOR) 80 MG tablet Take 1 tablet by mouth nightly      calcium carb-cholecalciferol (CALCIUM 600+D3) 600-5 MG-MCG TABS tablet Take 1 tablet by mouth nightly      ferrous gluconate (FERGON) 324 (38 Fe) MG tablet Take 1 tablet by mouth Every Monday, Wednesday, and Friday      Multiple Vitamins-Minerals (MULTIVITAL PO) Take 1 tablet by mouth daily      aspirin 81 MG tablet Take 1 tablet by mouth daily      atorvastatin (LIPITOR) 10 MG tablet Take 1 tablet by mouth daily (Patient not taking: Reported on 6/24/2024) 90 tablet 3    vitamin B-12 (CYANOCOBALAMIN) 100 MCG tablet Take 1 tablet by mouth daily 30 tablet 3       Of note, patient took AM meds prior to ED arrival today.    Timing of last doses updated.    Thank you,  Saumya No, FLORIhT       Private car

## 2024-06-25 NOTE — PROGRESS NOTES
Morning assessment and medications complete, pt cooperated and tolerated well. Medications given per MAR. VS stable. A&O X4. Pt denies pain at this time. Patient clean and dry, ambulates independently in room. Tele monitor on. NPO for testing. Bed side table, call light and belongings within reach. Bed locked and in lowest position. All questions and concerns addressed, no further needs at this time.

## 2024-06-25 NOTE — PROGRESS NOTES
4 Eyes Skin Assessment     The patient is being assess for  Admission    I agree that 2 RN's have performed a thorough Head to Toe Skin Assessment on the patient. ALL assessment sites listed below have been assessed.       Areas assessed by both nurses:   [x]   Head, Face, and Ears   [x]   Shoulders, Back, and Chest  [x]   Arms, Elbows, and Hands   [x]   Coccyx, Sacrum, and IschIum  [x]   Legs, Feet, and Heels        Does the Patient have Skin Breakdown?  No         Paramjit Prevention initiated:  Yes   Wound Care Orders initiated:  No      [facility] nurse consulted for Pressure Injury (Stage 3,4, Unstageable, DTI, NWPT, and Complex wounds), New and Established Ostomies:  No      Nurse 1 eSignature: Electronically signed by Jolly Louis RN on 6/25/24 at 3:58 AM EDT    **SHARE this note so that the co-signing nurse is able to place an eSignature**    Nurse 2 eSignature: Electronically signed by Beatrice Russell RN on 6/25/24 at 4:09 AM EDT

## 2024-06-25 NOTE — PLAN OF CARE
Problem: Discharge Planning  Goal: Discharge to home or other facility with appropriate resources  6/25/2024 0737 by Enriqueta Abdalla RN  Outcome: Progressing  6/25/2024 0056 by Jolly Louis RN  Outcome: Progressing     Problem: Pain  Goal: Verbalizes/displays adequate comfort level or baseline comfort level  6/25/2024 0737 by Enriqueta Abdalla RN  Outcome: Progressing  6/25/2024 0056 by Jolly Louis RN  Outcome: Progressing     Problem: Cardiovascular - Adult  Goal: Maintains optimal cardiac output and hemodynamic stability  6/25/2024 0737 by Enriqueta Abdalla RN  Outcome: Progressing  6/25/2024 0056 by Jolly Louis RN  Outcome: Progressing  Goal: Absence of cardiac dysrhythmias or at baseline  6/25/2024 0737 by Enriqueta Abdalla RN  Outcome: Progressing  6/25/2024 0056 by Jolly Louis RN  Outcome: Progressing

## 2024-06-25 NOTE — CONSULTS
[facility]   Electrophysiology consultation  Date: 6/25/2024  Reason for Consultation: New onset Atrial Fibrillation with RVR   Consult Requesting Physician: Lanie Garces MD     Chief Complaint   Patient presents with    Chest Pain     Reports intermittent right side chest pain radiating to neck and rt arm for 2 weeks.  States if she raises her arm above her head she can get relief from the pain.       CC: Chest pain   HPI: Ruthann Gardner is a 64 y.o. female  with past medical history of hyperlipidemia, anemia and tobacco use (current).     She presented to the emergency department with reproducible right-sided chest pain that radiates under the arm and up into the right side of her neck.  Intermittently present x 2 weeks.  Does not recall injury or trauma however she was carrying large cinderblocks just prior to onset of symptoms.  The patient reports that the pain is excruciating and actually awoke her from sleep. She did take ibuprofen on the day of admission without significant relief of symptoms.   Her initial EKG was sinus rhythm but later She was found to be in atrial fibrillation with RVR in the emergency room. Patient states she has history of irregular heartbeat on and off, unsure if it was atrial fibrillation or not. She has strong family history of atrial fibrillation. Echo has been done, will get stress test to rule out ischemic cause of arrhthymias. Will plan for monitor at discharge           Assessment:     New onset atrial fibrillation with RVR  Chest pain  Everyday cigarette smoker    Elevated troponin   Elevated pro bnp   Hypertension   Mild LV dysfunction- EF: 50-55%  Coronary calcification  Plan:     She has had history of palpitations but this time was more severe than before.  The chest pain also was significantly more severe than before and mostly right-sided with radiation to her right neck and right arm.  She has been lifting heavy blocks for a few days  Low normal left ventricular systolic function with a visually estimated EF of 50 - 55%. Left ventricle size is normal. Mildly increased wall thickness. Normal wall motion. Grade I diastolic dysfunction with normal LAP. Average E/e' ratio is 12.46.    Mitral Valve: Mildly thickened leaflet. Mild annular calcification of the mitral valve. Mild regurgitation.    Interatrial Septum: No interatrial shunt visualized with color Doppler. Agitated saline study was negative with and without provocation.    Image quality is adequate.    Stress test: pending       Scheduled Meds:   heparin (porcine)  5,000 Units SubCUTAneous 3 times per day    aspirin  81 mg Oral Daily    atorvastatin  80 mg Oral Nightly    sodium chloride flush  5-40 mL IntraVENous 2 times per day     Continuous Infusions:   dilTIAZem Stopped (06/24/24 2144)    sodium chloride       PRN Meds:.sodium chloride flush, sodium chloride, potassium chloride **OR** potassium alternative oral replacement **OR** potassium chloride, magnesium sulfate, ondansetron **OR** ondansetron, polyethylene glycol, acetaminophen **OR** acetaminophen     Prior to Admission medications    Medication Sig Start Date End Date Taking? Authorizing Provider   atorvastatin (LIPITOR) 80 MG tablet Take 1 tablet by mouth nightly   Yes Otilio Stuart MD   calcium carb-cholecalciferol (CALCIUM 600+D3) 600-5 MG-MCG TABS tablet Take 1 tablet by mouth nightly   Yes Otilio Stuart MD   ferrous gluconate (FERGON) 324 (38 Fe) MG tablet Take 1 tablet by mouth Every Monday, Wednesday, and Friday    Otilio Stuart MD   Multiple Vitamins-Minerals (MULTIVITAL PO) Take 1 tablet by mouth daily    Otilio Stuart MD   aspirin 81 MG tablet Take 1 tablet by mouth daily    Otilio Stuart MD   vitamin B-12 (CYANOCOBALAMIN) 100 MCG tablet Take 1 tablet by mouth daily 7/24/17 6/24/24  Gary Gooden MD       Physical Examination:  Vitals:    06/25/24 0854   BP: 135/77   Pulse:

## 2024-06-25 NOTE — PROGRESS NOTES
Patient admitted to room 3318 in a stable condition. Patient is alert and oriented. Admission completed. Patients head to toe assessment completed. Vital signs WNL. Respirations easy and unlabored. call light within reach. Scheduled medications given per MAR. Patient denies any pain at the moment. Patient resting in bed.

## 2024-06-26 ENCOUNTER — APPOINTMENT (OUTPATIENT)
Dept: GENERAL RADIOLOGY | Age: 64
End: 2024-06-26
Payer: COMMERCIAL

## 2024-06-26 PROBLEM — I25.83 CORONARY ARTERY DISEASE DUE TO LIPID RICH PLAQUE: Status: ACTIVE | Noted: 2024-06-26

## 2024-06-26 PROBLEM — I21.4 NSTEMI (NON-ST ELEVATED MYOCARDIAL INFARCTION) (HCC): Status: ACTIVE | Noted: 2024-06-26

## 2024-06-26 PROBLEM — I24.9 ACS (ACUTE CORONARY SYNDROME) (HCC): Status: ACTIVE | Noted: 2024-06-26

## 2024-06-26 PROBLEM — I25.10 CORONARY ARTERY DISEASE DUE TO LIPID RICH PLAQUE: Status: ACTIVE | Noted: 2024-06-26

## 2024-06-26 LAB
ANION GAP SERPL CALCULATED.3IONS-SCNC: 9 MMOL/L (ref 3–16)
ANTI-XA UNFRAC HEPARIN: 0.28 IU/ML (ref 0.3–0.7)
ANTI-XA UNFRAC HEPARIN: 0.39 IU/ML (ref 0.3–0.7)
ANTI-XA UNFRAC HEPARIN: 0.52 IU/ML (ref 0.3–0.7)
APTT BLD: 36.5 SEC (ref 22.1–36.4)
BUN SERPL-MCNC: 11 MG/DL (ref 7–20)
CALCIUM SERPL-MCNC: 9 MG/DL (ref 8.3–10.6)
CHLORIDE SERPL-SCNC: 106 MMOL/L (ref 99–110)
CO2 SERPL-SCNC: 25 MMOL/L (ref 21–32)
CREAT SERPL-MCNC: 0.6 MG/DL (ref 0.6–1.2)
DEPRECATED RDW RBC AUTO: 16.3 % (ref 12.4–15.4)
DEPRECATED RDW RBC AUTO: 16.3 % (ref 12.4–15.4)
ECHO BSA: 1.68 M2
EKG ATRIAL RATE: 63 BPM
EKG DIAGNOSIS: NORMAL
EKG P AXIS: 67 DEGREES
EKG P-R INTERVAL: 128 MS
EKG Q-T INTERVAL: 450 MS
EKG QRS DURATION: 86 MS
EKG QTC CALCULATION (BAZETT): 460 MS
EKG R AXIS: 66 DEGREES
EKG T AXIS: -34 DEGREES
EKG VENTRICULAR RATE: 63 BPM
GFR SERPLBLD CREATININE-BSD FMLA CKD-EPI: >90 ML/MIN/{1.73_M2}
GLUCOSE SERPL-MCNC: 97 MG/DL (ref 70–99)
HCT VFR BLD AUTO: 31.8 % (ref 36–48)
HCT VFR BLD AUTO: 33.6 % (ref 36–48)
HGB BLD-MCNC: 10.5 G/DL (ref 12–16)
HGB BLD-MCNC: 11.1 G/DL (ref 12–16)
INR PPP: 1.03 (ref 0.85–1.15)
MAGNESIUM SERPL-MCNC: 2.1 MG/DL (ref 1.8–2.4)
MCH RBC QN AUTO: 26.6 PG (ref 26–34)
MCH RBC QN AUTO: 26.8 PG (ref 26–34)
MCHC RBC AUTO-ENTMCNC: 32.9 G/DL (ref 31–36)
MCHC RBC AUTO-ENTMCNC: 33 G/DL (ref 31–36)
MCV RBC AUTO: 80.8 FL (ref 80–100)
MCV RBC AUTO: 81.2 FL (ref 80–100)
PLATELET # BLD AUTO: 261 K/UL (ref 135–450)
PLATELET # BLD AUTO: 276 K/UL (ref 135–450)
PMV BLD AUTO: 8.5 FL (ref 5–10.5)
PMV BLD AUTO: 8.7 FL (ref 5–10.5)
POC ACT LR: >400 SEC
POTASSIUM SERPL-SCNC: 4.1 MMOL/L (ref 3.5–5.1)
PROTHROMBIN TIME: 13.7 SEC (ref 11.9–14.9)
RBC # BLD AUTO: 3.94 M/UL (ref 4–5.2)
RBC # BLD AUTO: 4.14 M/UL (ref 4–5.2)
SODIUM SERPL-SCNC: 140 MMOL/L (ref 136–145)
WBC # BLD AUTO: 7.7 K/UL (ref 4–11)
WBC # BLD AUTO: 8.6 K/UL (ref 4–11)

## 2024-06-26 PROCEDURE — 83735 ASSAY OF MAGNESIUM: CPT

## 2024-06-26 PROCEDURE — 99291 CRITICAL CARE FIRST HOUR: CPT | Performed by: INTERNAL MEDICINE

## 2024-06-26 PROCEDURE — 6370000000 HC RX 637 (ALT 250 FOR IP): Performed by: NURSE PRACTITIONER

## 2024-06-26 PROCEDURE — 85520 HEPARIN ASSAY: CPT

## 2024-06-26 PROCEDURE — 93010 ELECTROCARDIOGRAM REPORT: CPT | Performed by: INTERNAL MEDICINE

## 2024-06-26 PROCEDURE — 85027 COMPLETE CBC AUTOMATED: CPT

## 2024-06-26 PROCEDURE — 2100000000 HC CCU R&B

## 2024-06-26 PROCEDURE — 6360000002 HC RX W HCPCS: Performed by: NURSE PRACTITIONER

## 2024-06-26 PROCEDURE — 99232 SBSQ HOSP IP/OBS MODERATE 35: CPT

## 2024-06-26 PROCEDURE — 6370000000 HC RX 637 (ALT 250 FOR IP): Performed by: INTERNAL MEDICINE

## 2024-06-26 PROCEDURE — 80048 BASIC METABOLIC PNL TOTAL CA: CPT

## 2024-06-26 PROCEDURE — 85730 THROMBOPLASTIN TIME PARTIAL: CPT

## 2024-06-26 PROCEDURE — 6370000000 HC RX 637 (ALT 250 FOR IP): Performed by: STUDENT IN AN ORGANIZED HEALTH CARE EDUCATION/TRAINING PROGRAM

## 2024-06-26 PROCEDURE — 2000000000 HC ICU R&B

## 2024-06-26 PROCEDURE — 71045 X-RAY EXAM CHEST 1 VIEW: CPT

## 2024-06-26 PROCEDURE — 6360000002 HC RX W HCPCS: Performed by: INTERNAL MEDICINE

## 2024-06-26 PROCEDURE — 2580000003 HC RX 258: Performed by: INTERNAL MEDICINE

## 2024-06-26 PROCEDURE — 85610 PROTHROMBIN TIME: CPT

## 2024-06-26 RX ORDER — LANOLIN ALCOHOL/MO/W.PET/CERES
3 CREAM (GRAM) TOPICAL NIGHTLY PRN
Status: DISCONTINUED | OUTPATIENT
Start: 2024-06-26 | End: 2024-06-28 | Stop reason: HOSPADM

## 2024-06-26 RX ORDER — HEPARIN SODIUM 1000 [USP'U]/ML
60 INJECTION, SOLUTION INTRAVENOUS; SUBCUTANEOUS PRN
Status: DISCONTINUED | OUTPATIENT
Start: 2024-06-26 | End: 2024-06-28

## 2024-06-26 RX ORDER — TRAMADOL HYDROCHLORIDE 50 MG/1
25 TABLET ORAL ONCE
Status: COMPLETED | OUTPATIENT
Start: 2024-06-26 | End: 2024-06-26

## 2024-06-26 RX ORDER — HEPARIN SODIUM 10000 [USP'U]/100ML
5-30 INJECTION, SOLUTION INTRAVENOUS CONTINUOUS
Status: DISCONTINUED | OUTPATIENT
Start: 2024-06-26 | End: 2024-06-28

## 2024-06-26 RX ORDER — HEPARIN SODIUM 1000 [USP'U]/ML
30 INJECTION, SOLUTION INTRAVENOUS; SUBCUTANEOUS PRN
Status: DISCONTINUED | OUTPATIENT
Start: 2024-06-26 | End: 2024-06-28

## 2024-06-26 RX ORDER — ENOXAPARIN SODIUM 100 MG/ML
40 INJECTION SUBCUTANEOUS DAILY
Status: DISCONTINUED | OUTPATIENT
Start: 2024-06-26 | End: 2024-06-26 | Stop reason: ALTCHOICE

## 2024-06-26 RX ORDER — HEPARIN SODIUM 1000 [USP'U]/ML
60 INJECTION, SOLUTION INTRAVENOUS; SUBCUTANEOUS ONCE
Status: DISCONTINUED | OUTPATIENT
Start: 2024-06-26 | End: 2024-06-26

## 2024-06-26 RX ADMIN — TICAGRELOR 90 MG: 90 TABLET ORAL at 05:15

## 2024-06-26 RX ADMIN — ASPIRIN 81 MG: 81 TABLET, COATED ORAL at 09:24

## 2024-06-26 RX ADMIN — ENOXAPARIN SODIUM 40 MG: 100 INJECTION SUBCUTANEOUS at 09:24

## 2024-06-26 RX ADMIN — TICAGRELOR 90 MG: 90 TABLET ORAL at 20:06

## 2024-06-26 RX ADMIN — MELATONIN TAB 3 MG 3 MG: 3 TAB at 23:29

## 2024-06-26 RX ADMIN — ACETAMINOPHEN 650 MG: 325 TABLET ORAL at 05:15

## 2024-06-26 RX ADMIN — HEPARIN SODIUM 12 UNITS/KG/HR: 10000 INJECTION, SOLUTION INTRAVENOUS at 10:51

## 2024-06-26 RX ADMIN — TRAMADOL HYDROCHLORIDE 25 MG: 50 TABLET ORAL at 23:29

## 2024-06-26 RX ADMIN — METOPROLOL TARTRATE 25 MG: 25 TABLET, FILM COATED ORAL at 09:24

## 2024-06-26 RX ADMIN — SODIUM CHLORIDE, PRESERVATIVE FREE 10 ML: 5 INJECTION INTRAVENOUS at 20:07

## 2024-06-26 RX ADMIN — ACETAMINOPHEN 650 MG: 325 TABLET ORAL at 09:24

## 2024-06-26 RX ADMIN — ATORVASTATIN CALCIUM 80 MG: 80 TABLET, FILM COATED ORAL at 20:06

## 2024-06-26 ASSESSMENT — PAIN SCALES - GENERAL
PAINLEVEL_OUTOF10: 7
PAINLEVEL_OUTOF10: 3
PAINLEVEL_OUTOF10: 0
PAINLEVEL_OUTOF10: 0

## 2024-06-26 ASSESSMENT — PAIN DESCRIPTION - DESCRIPTORS
DESCRIPTORS: ACHING
DESCRIPTORS: ACHING

## 2024-06-26 ASSESSMENT — PAIN DESCRIPTION - ORIENTATION: ORIENTATION: LOWER

## 2024-06-26 ASSESSMENT — PAIN DESCRIPTION - LOCATION
LOCATION: BACK
LOCATION: BACK

## 2024-06-26 NOTE — PROGRESS NOTES
[facility]    6/26/2024 11:49 AM        Name: Ruthann Gardner .              Admitted: 6/24/2024  Primary Care Provider: Unknown, Provider, APRN - NP (Tel: None)      Chief complaint: 63 yo female presented with chest pain. Admitted with atrial fib with RVR.    Subjective: Resting in bed, family visiting. Says she feels good. S/p PCI to OM1 yesterday, remains on IABP. Denies chest pain, shortness of breath.      Reviewed interval ancillary notes    Current Medications  heparin (porcine) injection 3,500 Units, PRN  heparin (porcine) injection 1,800 Units, PRN  heparin 25,000 units in dextrose 5% 250 mL (premix) infusion, Continuous  sodium chloride flush 0.9 % injection 5-40 mL, 2 times per day  sodium chloride flush 0.9 % injection 5-40 mL, PRN  0.9 % sodium chloride infusion, PRN  acetaminophen (TYLENOL) tablet 650 mg, Q4H PRN  atorvastatin (LIPITOR) tablet 80 mg, Nightly  ticagrelor (BRILINTA) tablet 90 mg, BID  metoprolol tartrate (LOPRESSOR) tablet 25 mg, BID  nitroGLYCERIN 200 mcg/mL in dextrose 5%, Continuous  aspirin EC tablet 81 mg, Daily  sodium chloride flush 0.9 % injection 5-40 mL, 2 times per day  sodium chloride flush 0.9 % injection 5-40 mL, PRN  0.9 % sodium chloride infusion, PRN  potassium chloride (KLOR-CON M) extended release tablet 40 mEq, PRN   Or  potassium bicarb-citric acid (EFFER-K) effervescent tablet 40 mEq, PRN   Or  potassium chloride 10 mEq/100 mL IVPB (Peripheral Line), PRN  magnesium sulfate 2000 mg in 50 mL IVPB premix, PRN  ondansetron (ZOFRAN-ODT) disintegrating tablet 4 mg, Q8H PRN   Or  ondansetron (ZOFRAN) injection 4 mg, Q6H PRN  polyethylene glycol (GLYCOLAX) packet 17 g, Daily PRN  acetaminophen (TYLENOL) suppository 650 mg, Q6H PRN      Objective:  BP (!) 141/84   Pulse 60   Temp 98.4 °F (36.9 °C)   Resp 14   Ht 1.727 m (5' 8\")   Wt 59 kg (130 lb)   SpO2 99%   BMI

## 2024-06-26 NOTE — PROGRESS NOTES
[facility] Daily Progress Note      Admit Date:  6/24/2024      Cardiology consult: Chest pain    Subjective:  Ms. Gardner denies chest discomfort or shortness of breath..  Patient is new to me this admission.       History of present illness:   Ruthann Gardner has a PMH of anemia coronary calcification seen on CT scan and atrial fibrillation. Patient presented to hospital with complaints of chest discomfort    Objective:   BP (!) 140/85   Pulse 67   Temp 98.3 °F (36.8 °C) (Temporal)   Resp 15   Ht 1.727 m (5' 8\")   Wt 59 kg (130 lb)   SpO2 97%   BMI 19.77 kg/m²     Intake/Output Summary (Last 24 hours) at 6/26/2024 0831  Last data filed at 6/26/2024 0655  Gross per 24 hour   Intake --   Output 1720 ml   Net -1720 ml       Physical Exam:  General:  Awake, alert, oriented x 3, NAD  Skin:  Warm and dry  Neck:  JVD normal  Chest:  normal air entry  Cardiovascular:  RRR S1S2, no S3, no appreciable murmur  Abdomen:  Soft, ND, NT, No HSM  Extremities:  No edema.  IABP site unremarkable    Medications:    enoxaparin  40 mg SubCUTAneous Daily    sodium chloride flush  5-40 mL IntraVENous 2 times per day    atorvastatin  80 mg Oral Nightly    ticagrelor  90 mg Oral BID    metoprolol tartrate  25 mg Oral BID    aspirin  81 mg Oral Daily    atorvastatin  80 mg Oral Nightly    sodium chloride flush  5-40 mL IntraVENous 2 times per day      sodium chloride      nitroGLYCERIN 10 mcg/min (06/26/24 0003)    dilTIAZem Stopped (06/24/24 2144)    sodium chloride       sodium chloride flush, sodium chloride, acetaminophen, sodium chloride flush, sodium chloride, potassium chloride **OR** potassium alternative oral replacement **OR** potassium chloride, magnesium sulfate, ondansetron **OR** ondansetron, polyethylene glycol, [DISCONTINUED] acetaminophen **OR** acetaminophen    TELEMETRY (Personally reviewed by me): Sinus     Lab Data:  CBC:   Recent Labs     06/24/24  2307 06/25/24  0547 06/26/24  0130   WBC 6.0  Bari protocol stress test was performed. The patient was stressed for 6 min and 0 sec. The patient reported chest pain, dyspnea and fatigue during the stress test.    Assessment/Plan:  Principal Problem:    ACS (acute coronary syndrome) ([facility])  Plan: Non-STEMI secondary to complex circumflex and OM1 stenoses.  OM1 successfully revascularized with BRITTANY x 1.  Circumflex critically stenosed with severe tortuosity and ectasia/aneurysm and experienced no reflow for which IABP was placed.    Active Problems:    Atrial fibrillation with RVR ([facility])  Plan: Currently sinus rhythm.      Coronary artery calcification seen on computed tomography  Plan: Consistent with CAD.      Coronary artery disease due to lipid rich plaque  Plan: As evidenced by coronary calcification on CT scan      Dyslipidemia  Plan: High intensity statin for      Smoker  Plan: Cessation recommended.    Plan: Patient is hemodynamically stable.  Integrilin infusion is completed.  Will start IV heparin while IABP is then.  Begin IABP weaning l tomorrow morning and hopeful removal tomorrow.  DAPT for 12 to 18 months.            Ryne Sanchez MD 6/26/2024 8:31 AM    Critical care time: 35 minutes

## 2024-06-26 NOTE — PROGRESS NOTES
[facility], [facility] [place]   Electrophysiology   Date: 6/26/2024  Reason for Follow up: Atrial Fibrillation   Chief Complaint   Patient presents with    Chest Pain     Reports intermittent right side chest pain radiating to neck and rt arm for 2 weeks.  States if she raises her arm above her head she can get relief from the pain.       HPI: Ruthann Gardner is a 64 y.o. female  past medical history of hyperlipidemia, anemia and tobacco use (current).      She presented to the emergency department with reproducible right-sided chest pain that radiates under the arm and up into the right side of her neck.  Intermittently present x 2 weeks.  Does not recall injury or trauma however she was carrying large cinderblocks just prior to onset of symptoms.  The patient reports that the pain is excruciating and actually awoke her from sleep. She did take ibuprofen on the day of admission without significant relief of symptoms.     Her initial EKG was sinus rhythm but later She was found to be in atrial fibrillation with RVR in the emergency room. Patient states she has history of irregular heartbeat on and off, unsure if it was atrial fibrillation or not. She has strong family history of cardiac diease. CT of chest showed calcified coronaries. Echo showed mild LV dysfunction, stress test was positive, leading to LHC : OM1 successfully revascularized with BRITTANY x 1, Circumflex critically stenosed with severe tortuosity and ectasia/aneurysm and experienced no reflow for which IABP was placed. Plan for weaning of balloon pump tomorrow morning and hopefully removal tomorrow afternoon.     Patient seen this morning. She feels \"ok\". Denies cp, sob or palpations. She is hopefully the balloon pump can be removed tomorrow so she can be more mobile.     .   [id number] Score for Atrial Fibrillation Stroke Risk   Risk   Factors  Component Value   C CHF No 0   H HTN No 0   A2 Age >= 75 No,  (64 y.o.) 0   D DM No 0   S2 Prior

## 2024-06-26 NOTE — ED PROVIDER NOTES
This is my PAUL Supervisory and shared visit note:     This patient was seen by the advanced practice provider.     I personally saw the patient and made/approved the management plan and take responsibility for the patient management.      Briefly, 64 y.o. female presents with chest pain.  Pain localized over the right side and substernal region.  Patient denies cardiac history.  Patient has had intermittent chest pain over the last 2 weeks.  Patient denies shortness of breath.    Focused exam:   Gen: awake, alert, and NAD  HEENT: NCAT. EOMI.   CV: RRR w/o MRG  Lungs: CTAB. No incr WOB.   Abdomen: Soft, nontender, nondistended. No rebound/guarding.   Neuro: Moving all extremities, fluent speech, follows commands     My EKG interpretation:     Normal sinus rhythm, ventricular rate of 61, no STEMI, normal axis    Atrial fibrillation, ventricular rate of 126, normal axis, no STEMI    MDM:   Patient is hemodynamic stable upon arrival to the emergency department.  Patient is afebrile. Patient is hemodynamically stable upon arrival to the emergency department.  Patient is afebrile.  Heart rate within normal limits.  Patient's O2 saturation within normal limits on room air.  Differential diagnosis includes but not limited to ACS, musculoskeletal pain, pneumothorax, pneumonia, costochondritis, aortic dissection, pulmonary embolism, pericarditis, acute esophageal injury/pathology.  EKG and CXR were obtained.  Cardiac biomarker's ordered along with cbc and electrolytes.  Patient given 324 mg of aspirin.  Patient ordered Nitropaste for chest pain.    Update:  I personally interpreted CXR  which showed no acute cardiopulmonary process,  making pneumonia or pneumothorax unlikely. It does not demonstrate any signs of mediastinal widening to indicate aortic dissection, in addition pulses +2 b/l. EKG was done and revealed no signs of acute ischemia or pericarditis.  CMP unremarkable.  D-dimer elevated to 0.63, CBC shows hemoglobin

## 2024-06-26 NOTE — PLAN OF CARE
Problem: Discharge Planning  Goal: Discharge to home or other facility with appropriate resources  Recent Flowsheet Documentation  Taken 6/25/2024 2000 by Valdez Mathews RN  Discharge to home or other facility with appropriate resources: Identify barriers to discharge with patient and caregiver     Problem: Cardiovascular - Adult  Goal: Maintains optimal cardiac output and hemodynamic stability  Outcome: Progressing  Flowsheets (Taken 6/25/2024 2000)  Maintains optimal cardiac output and hemodynamic stability: Monitor blood pressure and heart rate  Goal: Absence of cardiac dysrhythmias or at baseline  Recent Flowsheet Documentation  Taken 6/25/2024 2000 by Valdez Mathews RN  Absence of cardiac dysrhythmias or at baseline: Monitor cardiac rate and rhythm     Problem: Cardiovascular - Adult  Goal: Absence of cardiac dysrhythmias or at baseline  Recent Flowsheet Documentation  Taken 6/25/2024 2000 by Valdez Mathews RN  Absence of cardiac dysrhythmias or at baseline: Monitor cardiac rate and rhythm

## 2024-06-26 NOTE — CARE COORDINATION
Chart reviewed, pt had LHC with PCI. Has IABP    CM will follow for discharge needs.      Deb Osborne RN, BSN  [phone]

## 2024-06-27 ENCOUNTER — APPOINTMENT (OUTPATIENT)
Dept: GENERAL RADIOLOGY | Age: 64
End: 2024-06-27
Payer: COMMERCIAL

## 2024-06-27 ENCOUNTER — APPOINTMENT (OUTPATIENT)
Age: 64
End: 2024-06-27
Attending: INTERNAL MEDICINE
Payer: COMMERCIAL

## 2024-06-27 LAB
ANION GAP SERPL CALCULATED.3IONS-SCNC: 8 MMOL/L (ref 3–16)
ANTI-XA UNFRAC HEPARIN: 0.27 IU/ML (ref 0.3–0.7)
ANTI-XA UNFRAC HEPARIN: 0.37 IU/ML (ref 0.3–0.7)
BASOPHILS # BLD: 0.1 K/UL (ref 0–0.2)
BASOPHILS NFR BLD: 1.1 %
BUN SERPL-MCNC: 8 MG/DL (ref 7–20)
CALCIUM SERPL-MCNC: 8.4 MG/DL (ref 8.3–10.6)
CHLORIDE SERPL-SCNC: 106 MMOL/L (ref 99–110)
CHOLEST SERPL-MCNC: 124 MG/DL (ref 0–199)
CO2 SERPL-SCNC: 25 MMOL/L (ref 21–32)
CREAT SERPL-MCNC: 0.5 MG/DL (ref 0.6–1.2)
DEPRECATED RDW RBC AUTO: 16.2 % (ref 12.4–15.4)
ECHO AO ROOT DIAM: 3 CM
ECHO AO ROOT INDEX: 1.76 CM/M2
ECHO BSA: 1.68 M2
ECHO LA DIAMETER INDEX: 1.59 CM/M2
ECHO LA DIAMETER: 2.7 CM
ECHO LA TO AORTIC ROOT RATIO: 0.9
ECHO LV EDV A2C: 68 ML
ECHO LV EDV A4C: 58 ML
ECHO LV EDV INDEX A4C: 34 ML/M2
ECHO LV EDV NDEX A2C: 40 ML/M2
ECHO LV EJECTION FRACTION A2C: 60 %
ECHO LV EJECTION FRACTION A4C: 63 %
ECHO LV EJECTION FRACTION BIPLANE: 60 % (ref 55–100)
ECHO LV ESV A2C: 27 ML
ECHO LV ESV A4C: 22 ML
ECHO LV ESV INDEX A2C: 16 ML/M2
ECHO LV ESV INDEX A4C: 13 ML/M2
ECHO LV FRACTIONAL SHORTENING: 22 % (ref 28–44)
ECHO LV INTERNAL DIMENSION DIASTOLE INDEX: 2.41 CM/M2
ECHO LV INTERNAL DIMENSION DIASTOLIC: 4.1 CM (ref 3.9–5.3)
ECHO LV INTERNAL DIMENSION SYSTOLIC INDEX: 1.88 CM/M2
ECHO LV INTERNAL DIMENSION SYSTOLIC: 3.2 CM
ECHO LV IVSD: 1 CM (ref 0.6–0.9)
ECHO LV MASS 2D: 132.1 G (ref 67–162)
ECHO LV MASS INDEX 2D: 77.7 G/M2 (ref 43–95)
ECHO LV POSTERIOR WALL DIASTOLIC: 1 CM (ref 0.6–0.9)
ECHO LV RELATIVE WALL THICKNESS RATIO: 0.49
ECHO LVOT AREA: 3.1 CM2
ECHO LVOT DIAM: 2 CM
EKG ATRIAL RATE: 57 BPM
EKG DIAGNOSIS: NORMAL
EKG P AXIS: 44 DEGREES
EKG P-R INTERVAL: 140 MS
EKG Q-T INTERVAL: 486 MS
EKG QRS DURATION: 94 MS
EKG QTC CALCULATION (BAZETT): 473 MS
EKG R AXIS: 48 DEGREES
EKG T AXIS: 12 DEGREES
EKG VENTRICULAR RATE: 57 BPM
EOSINOPHIL # BLD: 0.4 K/UL (ref 0–0.6)
EOSINOPHIL NFR BLD: 4 %
GFR SERPLBLD CREATININE-BSD FMLA CKD-EPI: >90 ML/MIN/{1.73_M2}
GLUCOSE SERPL-MCNC: 111 MG/DL (ref 70–99)
HCT VFR BLD AUTO: 31.5 % (ref 36–48)
HDLC SERPL-MCNC: 46 MG/DL (ref 40–60)
HGB BLD-MCNC: 10.4 G/DL (ref 12–16)
LDLC SERPL CALC-MCNC: 43 MG/DL
LYMPHOCYTES # BLD: 1.2 K/UL (ref 1–5.1)
LYMPHOCYTES NFR BLD: 13.3 %
MAGNESIUM SERPL-MCNC: 1.8 MG/DL (ref 1.8–2.4)
MCH RBC QN AUTO: 26.8 PG (ref 26–34)
MCHC RBC AUTO-ENTMCNC: 33 G/DL (ref 31–36)
MCV RBC AUTO: 81.2 FL (ref 80–100)
MONOCYTES # BLD: 0.7 K/UL (ref 0–1.3)
MONOCYTES NFR BLD: 7.9 %
NEUTROPHILS # BLD: 6.5 K/UL (ref 1.7–7.7)
NEUTROPHILS NFR BLD: 73.7 %
PLATELET # BLD AUTO: 241 K/UL (ref 135–450)
PMV BLD AUTO: 8.6 FL (ref 5–10.5)
POTASSIUM SERPL-SCNC: 4.1 MMOL/L (ref 3.5–5.1)
POTASSIUM SERPL-SCNC: 4.1 MMOL/L (ref 3.5–5.1)
RBC # BLD AUTO: 3.88 M/UL (ref 4–5.2)
SODIUM SERPL-SCNC: 139 MMOL/L (ref 136–145)
TRIGL SERPL-MCNC: 176 MG/DL (ref 0–150)
VLDLC SERPL CALC-MCNC: 35 MG/DL
WBC # BLD AUTO: 8.8 K/UL (ref 4–11)

## 2024-06-27 PROCEDURE — 6360000002 HC RX W HCPCS: Performed by: INTERNAL MEDICINE

## 2024-06-27 PROCEDURE — 2000000000 HC ICU R&B

## 2024-06-27 PROCEDURE — 93005 ELECTROCARDIOGRAM TRACING: CPT | Performed by: NURSE PRACTITIONER

## 2024-06-27 PROCEDURE — 93010 ELECTROCARDIOGRAM REPORT: CPT | Performed by: INTERNAL MEDICINE

## 2024-06-27 PROCEDURE — 94760 N-INVAS EAR/PLS OXIMETRY 1: CPT

## 2024-06-27 PROCEDURE — 2580000003 HC RX 258: Performed by: INTERNAL MEDICINE

## 2024-06-27 PROCEDURE — 93308 TTE F-UP OR LMTD: CPT | Performed by: INTERNAL MEDICINE

## 2024-06-27 PROCEDURE — 2580000003 HC RX 258: Performed by: NURSE PRACTITIONER

## 2024-06-27 PROCEDURE — 80048 BASIC METABOLIC PNL TOTAL CA: CPT

## 2024-06-27 PROCEDURE — 93308 TTE F-UP OR LMTD: CPT

## 2024-06-27 PROCEDURE — 71045 X-RAY EXAM CHEST 1 VIEW: CPT

## 2024-06-27 PROCEDURE — 93325 DOPPLER ECHO COLOR FLOW MAPG: CPT | Performed by: INTERNAL MEDICINE

## 2024-06-27 PROCEDURE — 6360000002 HC RX W HCPCS: Performed by: STUDENT IN AN ORGANIZED HEALTH CARE EDUCATION/TRAINING PROGRAM

## 2024-06-27 PROCEDURE — 84132 ASSAY OF SERUM POTASSIUM: CPT

## 2024-06-27 PROCEDURE — 2580000003 HC RX 258: Performed by: STUDENT IN AN ORGANIZED HEALTH CARE EDUCATION/TRAINING PROGRAM

## 2024-06-27 PROCEDURE — 6370000000 HC RX 637 (ALT 250 FOR IP): Performed by: NURSE PRACTITIONER

## 2024-06-27 PROCEDURE — 83735 ASSAY OF MAGNESIUM: CPT

## 2024-06-27 PROCEDURE — 85025 COMPLETE CBC W/AUTO DIFF WBC: CPT

## 2024-06-27 PROCEDURE — 99232 SBSQ HOSP IP/OBS MODERATE 35: CPT

## 2024-06-27 PROCEDURE — 85520 HEPARIN ASSAY: CPT

## 2024-06-27 PROCEDURE — 80061 LIPID PANEL: CPT

## 2024-06-27 PROCEDURE — 6370000000 HC RX 637 (ALT 250 FOR IP): Performed by: INTERNAL MEDICINE

## 2024-06-27 PROCEDURE — 6360000002 HC RX W HCPCS

## 2024-06-27 PROCEDURE — 6370000000 HC RX 637 (ALT 250 FOR IP): Performed by: STUDENT IN AN ORGANIZED HEALTH CARE EDUCATION/TRAINING PROGRAM

## 2024-06-27 PROCEDURE — 2700000000 HC OXYGEN THERAPY PER DAY

## 2024-06-27 RX ORDER — 0.9 % SODIUM CHLORIDE 0.9 %
200 INTRAVENOUS SOLUTION INTRAVENOUS ONCE
Status: COMPLETED | OUTPATIENT
Start: 2024-06-27 | End: 2024-06-27

## 2024-06-27 RX ORDER — FENTANYL CITRATE 50 UG/ML
INJECTION, SOLUTION INTRAMUSCULAR; INTRAVENOUS
Status: COMPLETED
Start: 2024-06-27 | End: 2024-06-27

## 2024-06-27 RX ADMIN — FENTANYL CITRATE 50 MCG: 50 INJECTION INTRAMUSCULAR; INTRAVENOUS at 12:46

## 2024-06-27 RX ADMIN — ATORVASTATIN CALCIUM 80 MG: 80 TABLET, FILM COATED ORAL at 20:34

## 2024-06-27 RX ADMIN — METOPROLOL TARTRATE 25 MG: 25 TABLET, FILM COATED ORAL at 20:35

## 2024-06-27 RX ADMIN — ASPIRIN 81 MG: 81 TABLET, COATED ORAL at 09:16

## 2024-06-27 RX ADMIN — TICAGRELOR 90 MG: 90 TABLET ORAL at 20:35

## 2024-06-27 RX ADMIN — ACETAMINOPHEN 650 MG: 325 TABLET ORAL at 20:34

## 2024-06-27 RX ADMIN — FENTANYL CITRATE 50 MCG: 50 INJECTION, SOLUTION INTRAMUSCULAR; INTRAVENOUS at 12:46

## 2024-06-27 RX ADMIN — SODIUM CHLORIDE, PRESERVATIVE FREE 10 ML: 5 INJECTION INTRAVENOUS at 09:16

## 2024-06-27 RX ADMIN — MELATONIN TAB 3 MG 3 MG: 3 TAB at 20:34

## 2024-06-27 RX ADMIN — TICAGRELOR 90 MG: 90 TABLET ORAL at 09:16

## 2024-06-27 RX ADMIN — SODIUM CHLORIDE 200 ML: 9 INJECTION, SOLUTION INTRAVENOUS at 02:09

## 2024-06-27 RX ADMIN — HEPARIN SODIUM 1800 UNITS: 1000 INJECTION INTRAVENOUS; SUBCUTANEOUS at 05:06

## 2024-06-27 RX ADMIN — ONDANSETRON 4 MG: 2 INJECTION INTRAMUSCULAR; INTRAVENOUS at 01:34

## 2024-06-27 ASSESSMENT — PAIN SCALES - GENERAL
PAINLEVEL_OUTOF10: 0
PAINLEVEL_OUTOF10: 0
PAINLEVEL_OUTOF10: 2
PAINLEVEL_OUTOF10: 0

## 2024-06-27 ASSESSMENT — PAIN DESCRIPTION - DESCRIPTORS: DESCRIPTORS: ACHING

## 2024-06-27 ASSESSMENT — PAIN DESCRIPTION - LOCATION: LOCATION: GENERALIZED

## 2024-06-27 ASSESSMENT — PAIN DESCRIPTION - FREQUENCY: FREQUENCY: CONTINUOUS

## 2024-06-27 ASSESSMENT — PAIN DESCRIPTION - PAIN TYPE: TYPE: ACUTE PAIN

## 2024-06-27 NOTE — PROGRESS NOTES
Pt expresses feeling better at this time blood pressure 102/67 (78) heart rate of 63 bolus finished at this time. Electronically signed by Bibi Hess RN on 6/27/2024 at 1:42 AM

## 2024-06-27 NOTE — PROGRESS NOTES
[facility], [facility] [place]   Electrophysiology   Date: 6/27/2024  Reason for Follow up: Atrial Fibrillation   Chief Complaint   Patient presents with    Chest Pain     Reports intermittent right side chest pain radiating to neck and rt arm for 2 weeks.  States if she raises her arm above her head she can get relief from the pain.       HPI: Ruthann Gardner is a 64 y.o. female  past medical history of hyperlipidemia, anemia and tobacco use (current).      She presented to the emergency department with reproducible right-sided chest pain that radiates under the arm and up into the right side of her neck.  Intermittently present x 2 weeks.  Does not recall injury or trauma however she was carrying large cinderblocks just prior to onset of symptoms.  The patient reports that the pain is excruciating and actually awoke her from sleep. She did take ibuprofen on the day of admission without significant relief of symptoms.     Her initial EKG was sinus rhythm but later She was found to be in atrial fibrillation with RVR in the emergency room. Patient states she has history of irregular heartbeat on and off, unsure if it was atrial fibrillation or not. She has strong family history of cardiac diease. CT of chest showed calcified coronaries. Echo showed mild LV dysfunction, stress test was positive, leading to LHC : OM1 successfully revascularized with BRITTANY x 1, Circumflex critically stenosed with severe tortuosity and ectasia/aneurysm and experienced no reflow for which IABP was placed.     Balloon pump was removed today. She will discharge on Eliquis 5 mg BID. Spoke to Dr. Ogden- he will discharge her on Plavix and Asa. Will give her 30 day monitor to see assess AF burden.       [id number] Score for Atrial Fibrillation Stroke Risk   Risk   Factors  Component Value   C CHF No 0   H HTN No 0   A2 Age >= 75 No,  (64 y.o.) 0   D DM No 0   S2 Prior Stroke/TIA No 0   V Vascular Disease Yes 1   A Age 65-74 No,  (64  nitroGLYCERIN Stopped (24 0125)    sodium chloride       PRN Meds:.heparin (porcine), heparin (porcine), melatonin, sodium chloride flush, sodium chloride, acetaminophen, sodium chloride flush, sodium chloride, potassium chloride **OR** potassium alternative oral replacement **OR** potassium chloride, magnesium sulfate, ondansetron **OR** ondansetron, polyethylene glycol, [DISCONTINUED] acetaminophen **OR** acetaminophen     Prior to Admission medications    Medication Sig Start Date End Date Taking? Authorizing Provider   atorvastatin (LIPITOR) 80 MG tablet Take 1 tablet by mouth nightly   Yes Otilio Stuart MD   calcium carb-cholecalciferol (CALCIUM 600+D3) 600-5 MG-MCG TABS tablet Take 1 tablet by mouth nightly   Yes Otilio Stuart MD   ferrous gluconate (FERGON) 324 (38 Fe) MG tablet Take 1 tablet by mouth Every Monday, Wednesday, and Friday    Otilio Stuart MD   Multiple Vitamins-Minerals (MULTIVITAL PO) Take 1 tablet by mouth daily    Otilio Stuart MD   aspirin 81 MG tablet Take 1 tablet by mouth daily    Otilio Stuart MD   vitamin B-12 (CYANOCOBALAMIN) 100 MCG tablet Take 1 tablet by mouth daily 17  Gary Gooden MD       Physical Examination:  Vitals:    24 1345   BP: 129/82   Pulse: 74   Resp: 17   Temp:    SpO2:       In: 812.3 [P.O.:480; I.V.:332.3]  Out: 2915    Wt Readings from Last 3 Encounters:   24 59 kg (130 lb)   24 59 kg (130 lb)   22 55.8 kg (123 lb)     Temp  Av °F (36.7 °C)  Min: 97.5 °F (36.4 °C)  Max: 98.4 °F (36.9 °C)  Pulse  Av.5  Min: 55  Max: 74  BP  Min: 55/46  Max: 154/76  SpO2  Av.8 %  Min: 88 %  Max: 100 %    Intake/Output Summary (Last 24 hours) at 2024 1400  Last data filed at 2024 1200  Gross per 24 hour   Intake 489.55 ml   Output 2015 ml   Net -1525.45 ml         Constitutional: Oriented. No distress.   Cardiovascular: Normal rate, regular rhythm, S1&S2.    Pulmonary/Chest:

## 2024-06-27 NOTE — PROGRESS NOTES
Pt blood pressure dropped to 55/46 at 0124 page placed to cardiology spoke with SNEHAL Pinzon  Pt placed in reverse trendelenburg  200 ml bolus at 999 given  IV zofran given  Nitro paused  EKG completed 0139  CXR portable placed to confirm placement of balloon pump  Electronically signed by Bibi Hess RN on 6/27/2024 at 1:40 AM

## 2024-06-27 NOTE — PROGRESS NOTES
[facility] Daily Progress Note      Admit Date:  6/24/2024      Cardiology consult: Chest pain    Subjective:  Ms. Gardner denies chest discomfort or shortness of breath  IABP removed at bedside      History of present illness:   Ruthann Gardner has a PMH of anemia coronary calcification seen on CT scan and atrial fibrillation. Patient presented to hospital with complaints of chest discomfort    Objective:   /63   Pulse 56   Temp 98.4 °F (36.9 °C) (Temporal)   Resp 14   Ht 1.727 m (5' 8\")   Wt 59 kg (130 lb)   SpO2 96%   BMI 19.77 kg/m²     Intake/Output Summary (Last 24 hours) at 6/27/2024 0744  Last data filed at 6/27/2024 0600  Gross per 24 hour   Intake 748.68 ml   Output 2500 ml   Net -1751.32 ml       Physical Exam:  General:  Awake, alert, oriented x 3, NAD  Skin:  Warm and dry  Neck:  JVD normal  Chest:  normal air entry  Cardiovascular:  RRR S1S2, no S3, no appreciable murmur  Abdomen:  Soft, ND, NT, No HSM  Extremities:  No edema.  IABP site unremarkable    Medications:    sodium chloride flush  5-40 mL IntraVENous 2 times per day    atorvastatin  80 mg Oral Nightly    ticagrelor  90 mg Oral BID    metoprolol tartrate  25 mg Oral BID    aspirin  81 mg Oral Daily    sodium chloride flush  5-40 mL IntraVENous 2 times per day      heparin (PORCINE) Infusion 14 Units/kg/hr (06/27/24 0507)    sodium chloride      nitroGLYCERIN Stopped (06/27/24 0125)    sodium chloride       heparin (porcine), heparin (porcine), melatonin, sodium chloride flush, sodium chloride, acetaminophen, sodium chloride flush, sodium chloride, potassium chloride **OR** potassium alternative oral replacement **OR** potassium chloride, magnesium sulfate, ondansetron **OR** ondansetron, polyethylene glycol, [DISCONTINUED] acetaminophen **OR** acetaminophen    TELEMETRY (Personally reviewed by me): Sinus     Lab Data:  CBC:   Recent Labs     06/26/24  0130 06/26/24  1025 06/27/24  0418   WBC 7.7 8.6 8.8   HGB

## 2024-06-27 NOTE — PLAN OF CARE
Problem: Discharge Planning  Goal: Discharge to home or other facility with appropriate resources  Outcome: Progressing     Problem: Pain  Goal: Verbalizes/displays adequate comfort level or baseline comfort level  Outcome: Progressing     Problem: Cardiovascular - Adult  Goal: Maintains optimal cardiac output and hemodynamic stability  Outcome: Progressing  Goal: Absence of cardiac dysrhythmias or at baseline  Outcome: Progressing     Problem: Safety - Adult  Goal: Free from fall injury  Outcome: Progressing

## 2024-06-27 NOTE — PROGRESS NOTES
Order received by physician to discontinue intra-aortic balloon pump.  removed IABP at bedside. Manual pressure held X 30 minutes and transparent occlusive dressing placed on site. No hematoma, no oozing. Patient tolerated well. Vitals every 15 minutes X 4, every 30 minutes, and every 1 hour X 4 hours. Right pedal pulse easily palpated. Will continue to monitor closely.

## 2024-06-27 NOTE — PROGRESS NOTES
Shift assessment completed and scheduled medications administered. Metoprolol held due to patient being bradycardic. Bilateral pulses palpable. Right femoral site is without swelling, blessing, or hematoma. Patient denies any needs. Updated on plan of care. Call light is within reach.

## 2024-06-27 NOTE — PROCEDURES
INTERVENTIONAL CARDIOLOGY BRIEF PROCEDURE NOTE    Patient IABP site prepped and drapped  Sutures cut and tagaderm removed followed by thorough chloroprep with a double area scrub    IABP placed in standby and removed, pressure placed on the SFA and CFA allowed to free bleed from arteriotomy site briefly.  Compressive occlusive pressure held for 5 minutes followed by an additional 20 minutes of non-occlusive pressure    Bed rest 4 hours    Stewart Ogden MD

## 2024-06-27 NOTE — PROGRESS NOTES
[facility]    6/27/2024 10:00 AM        Name: Ruthann Gardner .              Admitted: 6/24/2024  Primary Care Provider: Negar Bucio APRN - NP (Tel: [phone])      Chief complaint: 63 yo female presented with chest pain. Admitted with atrial fib with RVR.    Subjective: Resting in bed,  visiting. Patient states she feels much better. Issue with hypotension this am with sBP in 50s, responded to fluid bolus. At that time she felt warm, faint and nauseated. Says she feels fine now. Denies chest pain, shortness of breath, abdominal pain. Remains on IABP.     Reviewed interval ancillary notes    Current Medications  heparin (porcine) injection 3,500 Units, PRN  heparin (porcine) injection 1,800 Units, PRN  heparin 25,000 units in dextrose 5% 250 mL (premix) infusion, Continuous  melatonin tablet 3 mg, Nightly PRN  sodium chloride flush 0.9 % injection 5-40 mL, 2 times per day  sodium chloride flush 0.9 % injection 5-40 mL, PRN  0.9 % sodium chloride infusion, PRN  acetaminophen (TYLENOL) tablet 650 mg, Q4H PRN  atorvastatin (LIPITOR) tablet 80 mg, Nightly  ticagrelor (BRILINTA) tablet 90 mg, BID  metoprolol tartrate (LOPRESSOR) tablet 25 mg, BID  nitroGLYCERIN 200 mcg/mL in dextrose 5%, Continuous  aspirin EC tablet 81 mg, Daily  sodium chloride flush 0.9 % injection 5-40 mL, 2 times per day  sodium chloride flush 0.9 % injection 5-40 mL, PRN  0.9 % sodium chloride infusion, PRN  potassium chloride (KLOR-CON M) extended release tablet 40 mEq, PRN   Or  potassium bicarb-citric acid (EFFER-K) effervescent tablet 40 mEq, PRN   Or  potassium chloride 10 mEq/100 mL IVPB (Peripheral Line), PRN  magnesium sulfate 2000 mg in 50 mL IVPB premix, PRN  ondansetron (ZOFRAN-ODT) disintegrating tablet 4 mg, Q8H PRN   Or  ondansetron (ZOFRAN) injection 4 mg, Q6H PRN  polyethylene glycol (GLYCOLAX) packet 17 g, Daily  PRN  acetaminophen (TYLENOL) suppository 650 mg, Q6H PRN      Objective:  /69   Pulse 57   Temp 98.3 °F (36.8 °C) (Temporal)   Resp 17   Ht 1.727 m (5' 8\")   Wt 59 kg (130 lb)   SpO2 95%   BMI 19.77 kg/m²     Intake/Output Summary (Last 24 hours) at 6/27/2024 1000  Last data filed at 6/27/2024 0900  Gross per 24 hour   Intake 748.68 ml   Output 2290 ml   Net -1541.32 ml      Wt Readings from Last 3 Encounters:   06/27/24 59 kg (130 lb)   06/25/24 59 kg (130 lb)   12/14/22 55.8 kg (123 lb)     General:  Awake, alert, oriented in NAD  Skin:  Warm and dry.  No unusual bruising or rash  Neck:  Supple.    Chest:  Normal effort.  Clear to auscultation, no wheezes/rhonchi/rales  Cardiovascular:  RRR, normal S1/S2, no murmur/gallop/rub, right femoral IABP site without bleeding, hematoma  Abdomen:  Soft, nontender, +bowel sounds  Extremities:  No edema  Neurological: No focal deficits  Psychological: Normal mood and affect    Labs and Tests:  CBC:   Recent Labs     06/26/24  0130 06/26/24  1025 06/27/24  0418   WBC 7.7 8.6 8.8   HGB 10.5* 11.1* 10.4*    276 241     BMP:    Recent Labs     06/25/24  0547 06/25/24  1848 06/26/24  0130 06/27/24  0418     --  140 139   K 3.9   < > 4.1 4.1  4.1     --  106 106   CO2 23  --  25 25   BUN 10  --  11 8   CREATININE 0.7  --  0.6 0.5*   GLUCOSE 96  --  97 111*    < > = values in this interval not displayed.     Hepatic:   Recent Labs     06/24/24  1653   AST 24   ALT 25   BILITOT 0.6   ALKPHOS 81     CXR 6/24/2024:  MPRESSION:  Questionable perihilar opacities.  Frontal and lateral views of the chest would be helpful    CT pulm 6/24/2024:  IMPRESSION:  No evidence of pulmonary embolism or acute pulmonary abnormality.  Coronary calcifications.  Emphysema.    Echo 6/25/2024:    Left Ventricle: Low normal left ventricular systolic function with a visually estimated EF of 50 - 55%. Left ventricle size is normal. Mildly increased wall thickness. Normal wall

## 2024-06-28 VITALS
BODY MASS INDEX: 20.11 KG/M2 | RESPIRATION RATE: 16 BRPM | SYSTOLIC BLOOD PRESSURE: 122 MMHG | TEMPERATURE: 97.4 F | DIASTOLIC BLOOD PRESSURE: 91 MMHG | HEIGHT: 68 IN | HEART RATE: 64 BPM | WEIGHT: 132.72 LBS | OXYGEN SATURATION: 98 %

## 2024-06-28 LAB
ANION GAP SERPL CALCULATED.3IONS-SCNC: 10 MMOL/L (ref 3–16)
BUN SERPL-MCNC: 14 MG/DL (ref 7–20)
CALCIUM SERPL-MCNC: 8.8 MG/DL (ref 8.3–10.6)
CHLORIDE SERPL-SCNC: 104 MMOL/L (ref 99–110)
CO2 SERPL-SCNC: 22 MMOL/L (ref 21–32)
CREAT SERPL-MCNC: 0.6 MG/DL (ref 0.6–1.2)
DEPRECATED RDW RBC AUTO: 16 % (ref 12.4–15.4)
GFR SERPLBLD CREATININE-BSD FMLA CKD-EPI: >90 ML/MIN/{1.73_M2}
GLUCOSE SERPL-MCNC: 117 MG/DL (ref 70–99)
HCT VFR BLD AUTO: 30.9 % (ref 36–48)
HGB BLD-MCNC: 10.4 G/DL (ref 12–16)
MAGNESIUM SERPL-MCNC: 1.7 MG/DL (ref 1.8–2.4)
MCH RBC QN AUTO: 27.3 PG (ref 26–34)
MCHC RBC AUTO-ENTMCNC: 33.7 G/DL (ref 31–36)
MCV RBC AUTO: 80.9 FL (ref 80–100)
PLATELET # BLD AUTO: 235 K/UL (ref 135–450)
PMV BLD AUTO: 8.4 FL (ref 5–10.5)
POTASSIUM SERPL-SCNC: 4.1 MMOL/L (ref 3.5–5.1)
RBC # BLD AUTO: 3.82 M/UL (ref 4–5.2)
SODIUM SERPL-SCNC: 136 MMOL/L (ref 136–145)
WBC # BLD AUTO: 7.2 K/UL (ref 4–11)

## 2024-06-28 PROCEDURE — 83735 ASSAY OF MAGNESIUM: CPT

## 2024-06-28 PROCEDURE — 6370000000 HC RX 637 (ALT 250 FOR IP): Performed by: INTERNAL MEDICINE

## 2024-06-28 PROCEDURE — 6370000000 HC RX 637 (ALT 250 FOR IP): Performed by: STUDENT IN AN ORGANIZED HEALTH CARE EDUCATION/TRAINING PROGRAM

## 2024-06-28 PROCEDURE — 80048 BASIC METABOLIC PNL TOTAL CA: CPT

## 2024-06-28 PROCEDURE — 85027 COMPLETE CBC AUTOMATED: CPT

## 2024-06-28 PROCEDURE — 94760 N-INVAS EAR/PLS OXIMETRY 1: CPT

## 2024-06-28 PROCEDURE — 99232 SBSQ HOSP IP/OBS MODERATE 35: CPT

## 2024-06-28 PROCEDURE — 2580000003 HC RX 258: Performed by: INTERNAL MEDICINE

## 2024-06-28 RX ORDER — FENTANYL CITRATE 50 UG/ML
50 INJECTION, SOLUTION INTRAMUSCULAR; INTRAVENOUS ONCE
Status: COMPLETED | OUTPATIENT
Start: 2024-06-27 | End: 2024-06-27

## 2024-06-28 RX ADMIN — TICAGRELOR 90 MG: 90 TABLET ORAL at 08:29

## 2024-06-28 RX ADMIN — METOPROLOL TARTRATE 25 MG: 25 TABLET, FILM COATED ORAL at 08:28

## 2024-06-28 RX ADMIN — ASPIRIN 81 MG: 81 TABLET, COATED ORAL at 08:28

## 2024-06-28 RX ADMIN — SODIUM CHLORIDE, PRESERVATIVE FREE 10 ML: 5 INJECTION INTRAVENOUS at 08:29

## 2024-06-28 ASSESSMENT — PAIN SCALES - GENERAL
PAINLEVEL_OUTOF10: 0
PAINLEVEL_OUTOF10: 0

## 2024-06-28 NOTE — PROGRESS NOTES
[facility] [place]   Electrophysiology   Date: 6/28/2024  Reason for Follow up: Atrial Fibrillation   Chief Complaint   Patient presents with    Chest Pain     Reports intermittent right side chest pain radiating to neck and rt arm for 2 weeks.  States if she raises her arm above her head she can get relief from the pain.       HPI: Ruthann Gardner is a 64 y.o. female  past medical history of hyperlipidemia, anemia and tobacco use (current).      She presented to the emergency department with reproducible right-sided chest pain that radiates under the arm and up into the right side of her neck.  Intermittently present x 2 weeks.  Does not recall injury or trauma however she was carrying large cinderblocks just prior to onset of symptoms.  The patient reports that the pain is excruciating and actually awoke her from sleep. She did take ibuprofen on the day of admission without significant relief of symptoms.     Her initial EKG was sinus rhythm but later She was found to be in atrial fibrillation with RVR in the emergency room. Patient states she has history of irregular heartbeat on and off, unsure if it was atrial fibrillation or not. She has strong family history of cardiac diease. CT of chest showed calcified coronaries. Echo showed mild LV dysfunction, stress test was positive, leading to LHC : OM1 successfully revascularized with BRITTANY x 1, Circumflex critically stenosed with severe tortuosity and ectasia/aneurysm and experienced no reflow for which IABP was placed.     Balloon pump was removed yesterday. She will discharge on Eliquis 5 mg BID. Spoke to Dr. Ogden- he will discharge her on Plavix and Asa. Will give her 30 day monitor to see assess AF burden.     She  can discharge from the standpoint of EP. She will come into office Monday to get monitor put on. Eliquis 5 mg BID was sent to pharmacy.     [id number] Score for Atrial Fibrillation Stroke Risk   Risk   Factors  Component Value   C    Cardiovascular: Normal rate, regular rhythm, S1&S2.    Pulmonary/Chest: Bilateral respiratory sounds. No rhonchi.    Abdominal: Soft. No tenderness   Musculoskeletal: No edema  Neurological: Alert and oriented. Follows command    Active Hospital Problems    Diagnosis Date Noted    Coronary artery disease due to lipid rich plaque [I25.10, I25.83] 06/26/2024     Priority: High    Acute coronary syndrome (HCC) [I24.9] 06/26/2024     Priority: High    NSTEMI (non-ST elevated myocardial infarction) (HCC) [I21.4] 06/26/2024    Intermittent right-sided chest pain [R07.89] 06/25/2024    Smoker [F17.200] 06/25/2024    Coronary artery calcification seen on computed tomography [I25.10] 06/25/2024    Atrial fibrillation with RVR (HCC) [I48.91] 06/24/2024    Dyslipidemia [E78.5] 09/26/2017       Scheduled Meds:   sodium chloride flush  5-40 mL IntraVENous 2 times per day    atorvastatin  80 mg Oral Nightly    ticagrelor  90 mg Oral BID    metoprolol tartrate  25 mg Oral BID    aspirin  81 mg Oral Daily    sodium chloride flush  5-40 mL IntraVENous 2 times per day     Continuous Infusions:   sodium chloride      nitroGLYCERIN Stopped (06/27/24 0125)    sodium chloride       PRN Meds:.melatonin, sodium chloride flush, sodium chloride, acetaminophen, sodium chloride flush, sodium chloride, potassium chloride **OR** potassium alternative oral replacement **OR** potassium chloride, magnesium sulfate, ondansetron **OR** ondansetron, polyethylene glycol, [DISCONTINUED] acetaminophen **OR** acetaminophen   Allergies   Allergen Reactions    Codeine Other (See Comments)     \"I pass out\"     Other Diarrhea and Nausea And Vomiting     Artifical sweeteners      Penicillins Rash and Hives     All questions and concerns were addressed to the patient/family. Alternatives to my treatment were discussed. I have discussed the above stated plan and the patient verbalized understanding and agreed with the plan.    SEUN Wray -

## 2024-06-28 NOTE — PROGRESS NOTES
Pt has been ordered a 30 day MCOT by the MD.  An appointment has been made for this pt to have monitor placed in the cardiology office on 7/1/24 at 10:30AM.  Sharyn Weinberg RN

## 2024-06-28 NOTE — PROGRESS NOTES
[facility] Daily Progress Note      Admit Date:  6/24/2024      Cardiology consult: Chest pain    Subjective:  Ms. Gardner denies chest discomfort or shortness of breath  Doing well  Tele reviewed      History of present illness:   Ruthann Gardner has a PMH of anemia coronary calcification seen on CT scan and atrial fibrillation. Patient presented to hospital with complaints of chest discomfort    Objective:   Blood Pressure (Abnormal) 122/91   Pulse 64   Temperature 97.4 °F (36.3 °C) (Temporal)   Respiration 16   Height 1.727 m (5' 8\")   Weight 60.2 kg (132 lb 11.5 oz)   Oxygen Saturation 98%   Body Mass Index 20.18 kg/m²     Intake/Output Summary (Last 24 hours) at 6/28/2024 1618  Last data filed at 6/28/2024 1000  Gross per 24 hour   Intake 240 ml   Output 150 ml   Net 90 ml         Physical Exam:  General:  Awake, alert, oriented x 3, NAD  Skin:  Warm and dry  Neck:  JVD normal  Chest:  normal air entry  Cardiovascular:  RRR S1S2, no S3, no appreciable murmur  Abdomen:  Soft, ND, NT, No HSM  Extremities:  No edema.  IABP site unremarkable    Medications:    sodium chloride flush  5-40 mL IntraVENous 2 times per day    atorvastatin  80 mg Oral Nightly    ticagrelor  90 mg Oral BID    metoprolol tartrate  25 mg Oral BID    aspirin  81 mg Oral Daily    sodium chloride flush  5-40 mL IntraVENous 2 times per day      sodium chloride      nitroGLYCERIN Stopped (06/27/24 0125)    sodium chloride       melatonin, sodium chloride flush, sodium chloride, acetaminophen, sodium chloride flush, sodium chloride, potassium chloride **OR** potassium alternative oral replacement **OR** potassium chloride, magnesium sulfate, ondansetron **OR** ondansetron, polyethylene glycol, [DISCONTINUED] acetaminophen **OR** acetaminophen    TELEMETRY (Personally reviewed by me): Sinus     Lab Data:  CBC:   Recent Labs     06/26/24  1025 06/27/24  0418 06/28/24  0500   WBC 8.6 8.8 7.2   HGB 11.1* 10.4* 10.4*   HCT 33.6*

## 2024-06-28 NOTE — DISCHARGE SUMMARY
[facility]    Patient Demographics    Patient. Ruthann Gardner  Date of Birth. 1960  MRN. [id number]     Primary care provider. Negar Bucio APRN - NP  (Tel: [phone])    Admit date: 6/24/2024    Discharge date (blank if same as Note Date):   Note Date: 6/28/2024     Reason for Hospitalization.   Chief Complaint   Patient presents with    Chest Pain     Reports intermittent right side chest pain radiating to neck and rt arm for 2 weeks.  States if she raises her arm above her head she can get relief from the pain.       Significant Findings.   Principal Problem:    Acute coronary syndrome (HCC)  Active Problems:    Coronary artery disease due to lipid rich plaque    Dyslipidemia    Atrial fibrillation with RVR (HCC)    Intermittent right-sided chest pain    Smoker    Coronary artery calcification seen on computed tomography    NSTEMI (non-ST elevated myocardial infarction) (HCC)  Resolved Problems:    * No resolved hospital problems. *     Problem-based Hospital Course.  Ruthann Gardner is a 64 y.o. female with a past medical history of hyperlipidemia, PAF, anemia, CAD who presented with chest pain.   Admitted with AF/RVR and NSTEMI.  Cardiology consulted,  s/p C 6/24 with complex circumflex and OM1 stenoses. OM1 successfully revascularized with BRITTANY x 1. Circumflex critically stenosed with severe tortuosity and ectasia/aneurysm and experienced no reflow for which IABP was placed.  IABP was removed 6/27/2024.  Ambulating in ley, no hematoma on right groin.  Feels well, denies symptoms today. Cardiology IC and EP OK with discharge.     Assessment and Plan:  Atrial fib with RVR  - Tachycardic on presentation, EKG showed a fib with RVR.  - SR on telemetry today without recurrence  - CT chest with coronary calcifications, negative PE   - Treated with diltiazem drip, converted to sinus, transitioned to oral metoprolol  - [id number] score 2 (gender,

## 2024-06-28 NOTE — DISCHARGE INSTRUCTIONS
Post Angiogram/ Intervention Discharge Instructions      Do you have the help you need at home?        Activity:  You may drive in 24hrs unless instructed by your doctor   Resume normal activity in one week  Avoid lifting, pushing, or pulling more than 10 lbs. For one week. (A gallon of milk is 7 lbs)  Limit stair climbing to once a day for two weeks.  You may walk at an easy pace on ground level.  Plan rest periods during the day.  Avoid tension and stress.  Learn to manage your stress.  Avoid work that increases muscle tension.        (straining with bowel movements, moving furniture)    Wound Care:  You may shower, but no bathtubs, pools, or hot tubs for one week.  Inspect area daily.  Normal observations:  Soreness and tenderness that may last for one week, mild pink to red oozing from incision site for up to 24 hrs after discharge,    bruising that could last up to two weeks.  Clean with soap and water.  NO lotion or powder.  Dry area thoroughly.  Apply band    aide for 48 hrs.    Nutrition:   Low fat, low salt diet (guidelines for Heart Healthy eating)  Limit caffeine to 1-2 cups per day (coffee, tea, chocolate, soda)  Eat high fiber to avoid constipation and straining during bowel movements (fresh veggies and fruit, whole grain)  Limit alcohol to two servings a day ( 8 oz beer, 1 oz liquor, 4 oz wine )  Drink at least 8 to 10 glasses of decaffeinated, non-alcoholic fluid for the next 24 hours to flush the x-ray dye used for your angiogram out of your body.     Depression:  It is not unusual to have feelings of anxiety, fear, or depression after your procedure.  If you need help with these feelings, call your primary care physician.  There are medications to help you and healing usually occurs sooner if you are not depressed.    Cardiac Rehab Information Given : [phone]  Your physician has referred you to Cardiac Rehab. This is an important part of your recovery.   You have been given a brief  explanation of Cardiac Rehab and instructions when to call Cardiac Rehab.  The Cardiac Rehab team works with your cardiologist to start your Rehab at the appropriate time in your recovery.   Remember to discuss Cardiac Rehab with your physician at your first follow-up visit.        What symptoms or health problems do you need to look out for after you leave the hospital? Call your physician if you have any of these symptoms.     Increased shortness of breath, weakness, or increased fatigue  A fast or a slow heartbeat  Problems or questions with your medications  Bleeding that is not stopped after holding pressure for 10 min  Feeling lightheaded or dizzy  Increased swelling or bruising of the groin or leg  Unusual pain, numbness or tingling at the groin or down the leg  Any signs of infection ( redness, yellow drainage, swelling, pain, fever)  Unusual swelling of lower legs/feet, chest discomfort, unusual weakness or fatigue

## 2024-06-28 NOTE — PROGRESS NOTES
[facility] [place]    Admit Date: 2024    PCP: Negar Bucio APRN - NP                  : 1960  MRN: [id number]    Subjective:   Interval History:   Patient seen and examined. Clinical notes reviewed.   Telemetry reviewed. No new complaint today.   No major events overnight.   Denies having chest pain, shortness of breath, dyspnea on exertion, Orthopnea, PND at the time of this visit.    Review of System:  Pertinent positive and negatives are in the HPI and interval above, the rest are negative.     Data:   Scheduled Meds:   sodium chloride flush  5-40 mL IntraVENous 2 times per day    atorvastatin  80 mg Oral Nightly    ticagrelor  90 mg Oral BID    metoprolol tartrate  25 mg Oral BID    aspirin  81 mg Oral Daily    sodium chloride flush  5-40 mL IntraVENous 2 times per day     PRN Meds:melatonin, sodium chloride flush, sodium chloride, acetaminophen, sodium chloride flush, sodium chloride, potassium chloride **OR** potassium alternative oral replacement **OR** potassium chloride, magnesium sulfate, ondansetron **OR** ondansetron, polyethylene glycol, [DISCONTINUED] acetaminophen **OR** acetaminophen  I/O last 3 completed shifts:  In: 182.9 [I.V.:182.9]  Out: 1540 [Urine:1540]  No intake/output data recorded.    Intake/Output Summary (Last 24 hours) at 2024 0912  Last data filed at 2024 1700  Gross per 24 hour   Intake 63.64 ml   Output 425 ml   Net -361.36 ml           Objective:     Vitals: /66   Pulse 64   Temp 97.1 °F (36.2 °C) (Temporal)   Resp 16   Ht 1.727 m (5' 8\")   Wt 60.2 kg (132 lb 11.5 oz)   SpO2 98%   BMI 20.18 kg/m²     Physical Exam        LABS:    CBC:   Recent Labs     24  1025 24  0418 24  0500   WBC 8.6 8.8 7.2   HGB 11.1* 10.4* 10.4*   HCT 33.6* 31.5* 30.9*   MCV 81.2 81.2 80.9    241 235                                                                BMP:    Recent Labs     24  0130 24  0418    139   K 4.1 4.1

## 2024-06-28 NOTE — CONSULTS
Pharmacy to check patient copay for  Xarelto/Eliquis    Copay for patient will be: $0/month for Xarelto    Unable to check Eliquis copay as already being filled at [facility] and they're out for lunch. Would anticipate $0 copay as well.    Pharmacy will continue to follow the decision on therapy and  the patient if appropriate.       Afia Horta, PharmD, BCPS  Clinical Pharmacist  [phone]

## 2024-06-28 NOTE — PLAN OF CARE
Problem: Discharge Planning  Goal: Discharge to home or other facility with appropriate resources  Outcome: Completed     Problem: Pain  Goal: Verbalizes/displays adequate comfort level or baseline comfort level  Outcome: Completed     Problem: Cardiovascular - Adult  Goal: Maintains optimal cardiac output and hemodynamic stability  Outcome: Completed  Goal: Absence of cardiac dysrhythmias or at baseline  Outcome: Completed     Problem: Safety - Adult  Goal: Free from fall injury  Outcome: Completed

## 2024-07-05 ENCOUNTER — TELEPHONE (OUTPATIENT)
Dept: CARDIOLOGY CLINIC | Age: 64
End: 2024-07-05

## 2024-07-05 NOTE — TELEPHONE ENCOUNTER
Dionna returning call - let her know about change in appointment. Dionna confirms this works for her.

## 2024-07-11 ENCOUNTER — TELEPHONE (OUTPATIENT)
Dept: CARDIOLOGY CLINIC | Age: 64
End: 2024-07-11

## 2024-07-11 NOTE — TELEPHONE ENCOUNTER
Pt states she has FMLA and Short term disability paper work she is going to drop off to our [facility] office today.

## 2024-07-19 ENCOUNTER — TELEPHONE (OUTPATIENT)
Dept: CARDIOLOGY CLINIC | Age: 64
End: 2024-07-19

## 2024-07-19 NOTE — TELEPHONE ENCOUNTER
Patient in on ASA given her history of coronary disease with recent stenting. Will forward message to Dr. Hill and Dr. Ogden regarding RTW. Per previous encounters FMLA and STD were to be completed.

## 2024-07-19 NOTE — TELEPHONE ENCOUNTER
Pt says she is feeling good and would like approval to return to work on July 29th. She would need a release letter for work and can pick that up when ready. Please call when ready.    Also, she said that her PCP curious as to why pt needs to continue the Aspirin 81 MG when she is taking Eliquis 5 Mg.    Please call pt to advise.

## 2024-07-22 NOTE — TELEPHONE ENCOUNTER
Spoke with pt and she states that she will be at [facility] tomorrow (7/23/24). She would like to pick the letter up from there tomorrow.   Thanks

## 2024-07-25 NOTE — TELEPHONE ENCOUNTER
Pt states that the letter does not have the return to work date printed on the letter and her employer needs it to state the return to work date is 7/29.    Please advise when revised letter is ready for .

## 2024-07-29 NOTE — TELEPHONE ENCOUNTER
Received refill request for Metoprolol and Brilinta from [facility] pharmacy.    Last ov:None    Last labs:2024 CBC    Last [id number]     Last Refill:2024     Next appointment:2024 AMARJIT

## 2024-07-29 NOTE — TELEPHONE ENCOUNTER
Medication Refill    Medication needing refilled:   metoprolol tartrate (LOPRESSOR) 25 MG -1 tablet by mouth 2 times daily     ticagrelor (BRILINTA) 90 MG -1 tablet by mouth 2 times daily - PT IS OUT OF MEDICATION AND ASKING IF SCRIPT CAN SENT TODAY    Dosage of the medication:    How are you taking this medication (QD, BID, TID, QID, PRN):    30 or 90 day supply called in: 90 day supply    When will you run out of your medication:    Which Pharmacy are we sending the medication to?:[id number] [place] [address] - P [phone] - F [phone]

## 2024-08-18 PROBLEM — I21.4 NSTEMI (NON-ST ELEVATED MYOCARDIAL INFARCTION) (HCC): Status: RESOLVED | Noted: 2024-06-26 | Resolved: 2024-08-18

## 2024-08-18 PROBLEM — I25.10 CORONARY ARTERY CALCIFICATION SEEN ON COMPUTED TOMOGRAPHY: Status: RESOLVED | Noted: 2024-06-25 | Resolved: 2024-08-18

## 2024-08-18 PROBLEM — S52.502A CLOSED FRACTURE OF LEFT DISTAL RADIUS: Status: RESOLVED | Noted: 2018-12-04 | Resolved: 2024-08-18

## 2024-08-18 PROBLEM — I24.9 ACUTE CORONARY SYNDROME (HCC): Status: RESOLVED | Noted: 2024-06-26 | Resolved: 2024-08-18

## 2024-08-18 PROBLEM — I48.0 PAF (PAROXYSMAL ATRIAL FIBRILLATION) (HCC): Status: ACTIVE | Noted: 2024-06-24

## 2024-08-26 ENCOUNTER — OFFICE VISIT (OUTPATIENT)
Dept: CARDIOLOGY CLINIC | Age: 64
End: 2024-08-26
Payer: COMMERCIAL

## 2024-08-26 VITALS
SYSTOLIC BLOOD PRESSURE: 104 MMHG | HEART RATE: 56 BPM | WEIGHT: 134 LBS | DIASTOLIC BLOOD PRESSURE: 60 MMHG | HEIGHT: 68 IN | BODY MASS INDEX: 20.31 KG/M2 | OXYGEN SATURATION: 99 %

## 2024-08-26 DIAGNOSIS — I25.10 CORONARY ARTERY DISEASE INVOLVING NATIVE CORONARY ARTERY OF NATIVE HEART WITHOUT ANGINA PECTORIS: Primary | ICD-10-CM

## 2024-08-26 DIAGNOSIS — I48.0 PAF (PAROXYSMAL ATRIAL FIBRILLATION) (HCC): ICD-10-CM

## 2024-08-26 DIAGNOSIS — E78.2 MIXED HYPERLIPIDEMIA: ICD-10-CM

## 2024-08-26 PROCEDURE — 99214 OFFICE O/P EST MOD 30 MIN: CPT | Performed by: INTERNAL MEDICINE

## 2024-08-26 RX ORDER — CLOPIDOGREL BISULFATE 75 MG/1
75 TABLET ORAL DAILY
Qty: 90 TABLET | Refills: 2 | Status: SHIPPED | OUTPATIENT
Start: 2024-08-26

## 2024-08-26 NOTE — PROGRESS NOTES
no rashes or lesions.   Pysch: Normal mood and affect. Alert and oriented to time place person   Neurologic: Normal gross motor and sensory exam.       Labs     All labs have been reviewed    Lab Results   Component Value Date/Time    WBC 7.2 06/28/2024 05:00 AM    RBC 3.82 06/28/2024 05:00 AM    HGB 10.4 06/28/2024 05:00 AM    HCT 30.9 06/28/2024 05:00 AM    MCV 80.9 06/28/2024 05:00 AM    RDW 16.0 06/28/2024 05:00 AM     06/28/2024 05:00 AM     Lab Results   Component Value Date/Time     06/28/2024 10:07 AM    K 4.1 06/28/2024 10:07 AM    K 4.1 06/27/2024 04:18 AM     06/28/2024 10:07 AM    CO2 22 06/28/2024 10:07 AM    BUN 14 06/28/2024 10:07 AM    CREATININE 0.6 06/28/2024 10:07 AM    GFRAA 106 04/26/2018 09:24 AM    AGRATIO 1.6 06/24/2024 04:53 PM    LABGLOM >90 06/28/2024 10:07 AM    LABGLOM 91 04/26/2018 09:24 AM    GLUCOSE 117 06/28/2024 10:07 AM    CALCIUM 8.8 06/28/2024 10:07 AM    BILITOT 0.6 06/24/2024 04:53 PM    ALKPHOS 81 06/24/2024 04:53 PM    AST 24 06/24/2024 04:53 PM    ALT 25 06/24/2024 04:53 PM     No results found for: \"PTINR\"  No results found for: \"LABA1C\"  Lab Results   Component Value Date    CKTOTAL 35 07/23/2017    TROPONINI <0.01 07/22/2017       Cardiac, Vascular and Imaging Data all Personally Reviewed in Detail by Myself      EKG:     Echocardiogram:   ECHO (limited) 6/27/2024  Left Ventricle: Normal left ventricular systolic function with a visually estimated EF of 60%. Left ventricle size is normal. Mildly increased wall thickness. Normal wall motion.  Aortic Valve: Trileaflet valve.  Mitral Valve: Mildly thickened leaflet. Mild annular calcification of the mitral valve.  Right Atrium: Right atrium appears mildly dilated.  Pericardium: Trivial pericardial effusion present.  Image quality is adequate.    ECHO 6/25/2024  Left Ventricle: Low normal left ventricular systolic function with a visually estimated EF of 50 - 55%. Left ventricle size is normal. Mildly  [phone]    This note was scribed in the presence of Dr Hill, by Sushma Sidhu RN.  Physician Attestation:  The scribes documentation has been prepared under my direction and personally reviewed by me in its entirety.     I confirm that the note above accurately reflects all work, treatment, procedures, and medical decision making performed by me.

## 2024-08-28 RX ORDER — METOPROLOL TARTRATE 25 MG/1
25 TABLET, FILM COATED ORAL 2 TIMES DAILY
Qty: 180 TABLET | Refills: 1 | Status: SHIPPED | OUTPATIENT
Start: 2024-08-28

## 2024-08-28 NOTE — TELEPHONE ENCOUNTER
Received refill request for metoprolol tartrate (LOPRESSOR) 25 MG  from [facility] pharmacy.     Last OV: 8/26/24    Next OV: 9/17/24    Last Labs:     Last Filled: 7/31/24

## 2024-09-06 ENCOUNTER — HOSPITAL ENCOUNTER (OUTPATIENT)
Age: 64
Discharge: HOME | End: 2024-09-08
Attending: INTERNAL MEDICINE
Payer: COMMERCIAL

## 2024-09-06 ENCOUNTER — TELEPHONE (OUTPATIENT)
Dept: CARDIOLOGY CLINIC | Age: 64
End: 2024-09-06

## 2024-09-06 DIAGNOSIS — I25.10 CORONARY ARTERY DISEASE INVOLVING NATIVE CORONARY ARTERY OF NATIVE HEART WITHOUT ANGINA PECTORIS: ICD-10-CM

## 2024-09-06 LAB
NUC STRESS EJECTION FRACTION: 60 %
NUC STRESS LV EDV: 110 ML (ref 56–104)
NUC STRESS LV ESV: 44 ML (ref 19–49)
NUC STRESS LV MASS: 145 G
STRESS BASELINE DIAS BP: 70 MMHG
STRESS BASELINE HR: 67 BPM
STRESS BASELINE SYS BP: 110 MMHG
STRESS ESTIMATED WORKLOAD: 1 METS
STRESS EXERCISE DUR MIN: 4 MIN
STRESS EXERCISE DUR SEC: 0 SEC
STRESS O2 SAT PEAK: 100 %
STRESS O2 SAT REST: 98 %
STRESS PEAK DIAS BP: 74 MMHG
STRESS PEAK SYS BP: 133 MMHG
STRESS PERCENT HR ACHIEVED: 65 %
STRESS POST PEAK HR: 102 BPM
STRESS RATE PRESSURE PRODUCT: ABNORMAL BPM*MMHG
STRESS TARGET HR: 157 BPM
TID: 1

## 2024-09-06 PROCEDURE — 3430000000 HC RX DIAGNOSTIC RADIOPHARMACEUTICAL: Performed by: INTERNAL MEDICINE

## 2024-09-06 PROCEDURE — 6360000002 HC RX W HCPCS: Performed by: INTERNAL MEDICINE

## 2024-09-06 PROCEDURE — 93016 CV STRESS TEST SUPVJ ONLY: CPT | Performed by: STUDENT IN AN ORGANIZED HEALTH CARE EDUCATION/TRAINING PROGRAM

## 2024-09-06 PROCEDURE — 78452 HT MUSCLE IMAGE SPECT MULT: CPT

## 2024-09-06 PROCEDURE — 93017 CV STRESS TEST TRACING ONLY: CPT

## 2024-09-06 PROCEDURE — 78452 HT MUSCLE IMAGE SPECT MULT: CPT | Performed by: STUDENT IN AN ORGANIZED HEALTH CARE EDUCATION/TRAINING PROGRAM

## 2024-09-06 PROCEDURE — A9502 TC99M TETROFOSMIN: HCPCS | Performed by: INTERNAL MEDICINE

## 2024-09-06 PROCEDURE — 93018 CV STRESS TEST I&R ONLY: CPT | Performed by: STUDENT IN AN ORGANIZED HEALTH CARE EDUCATION/TRAINING PROGRAM

## 2024-09-06 RX ORDER — REGADENOSON 0.08 MG/ML
0.4 INJECTION, SOLUTION INTRAVENOUS
Status: COMPLETED | OUTPATIENT
Start: 2024-09-06 | End: 2024-09-06

## 2024-09-06 RX ADMIN — REGADENOSON 0.4 MG: 0.08 INJECTION, SOLUTION INTRAVENOUS at 08:50

## 2024-09-06 RX ADMIN — TETROFOSMIN 10.8 MILLICURIE: 1.38 INJECTION, POWDER, LYOPHILIZED, FOR SOLUTION INTRAVENOUS at 07:54

## 2024-09-06 RX ADMIN — TETROFOSMIN 32.1 MILLICURIE: 1.38 INJECTION, POWDER, LYOPHILIZED, FOR SOLUTION INTRAVENOUS at 09:00

## 2024-09-06 NOTE — TELEPHONE ENCOUNTER
Per Dr Conn > there are no high risk findings on today's stress test and ordering provider will reach out to patient with any further direction.     Relayed message per Dr Conn to Dionna and she verbalizes understanding.

## 2024-09-17 ENCOUNTER — OFFICE VISIT (OUTPATIENT)
Dept: CARDIOLOGY CLINIC | Age: 64
End: 2024-09-17
Payer: COMMERCIAL

## 2024-09-17 VITALS
WEIGHT: 136 LBS | HEART RATE: 86 BPM | OXYGEN SATURATION: 95 % | HEIGHT: 68 IN | SYSTOLIC BLOOD PRESSURE: 130 MMHG | DIASTOLIC BLOOD PRESSURE: 76 MMHG | BODY MASS INDEX: 20.61 KG/M2

## 2024-09-17 DIAGNOSIS — I48.0 PAF (PAROXYSMAL ATRIAL FIBRILLATION) (HCC): ICD-10-CM

## 2024-09-17 DIAGNOSIS — I25.10 CORONARY ARTERY DISEASE DUE TO LIPID RICH PLAQUE: ICD-10-CM

## 2024-09-17 DIAGNOSIS — I25.83 CORONARY ARTERY DISEASE DUE TO LIPID RICH PLAQUE: ICD-10-CM

## 2024-09-17 DIAGNOSIS — F17.200 SMOKER: Primary | ICD-10-CM

## 2024-09-17 DIAGNOSIS — E78.5 DYSLIPIDEMIA: ICD-10-CM

## 2024-09-17 PROCEDURE — 99214 OFFICE O/P EST MOD 30 MIN: CPT | Performed by: NURSE PRACTITIONER

## 2024-09-17 PROCEDURE — 93000 ELECTROCARDIOGRAM COMPLETE: CPT | Performed by: NURSE PRACTITIONER

## 2024-12-30 RX ORDER — APIXABAN 5 MG/1
5 TABLET, FILM COATED ORAL 2 TIMES DAILY
Qty: 180 TABLET | Refills: 1 | Status: SHIPPED | OUTPATIENT
Start: 2024-12-30

## 2025-03-27 PROBLEM — I47.10 SVT (SUPRAVENTRICULAR TACHYCARDIA): Status: ACTIVE | Noted: 2025-03-27

## 2025-03-27 NOTE — PROGRESS NOTES
[facility]   Electrophysiology Follow up   Date: 4/9/2025  I had the privilege of visiting Ruthann Gardner in the office.     CC: PAF   HPI: Ruthann Gardner is a 65 y.o. female with a past medical history of HTN, HLD, tobacco use, CAD, and PAF.     In June of 2024, pt presented to hospital due to chest pain. She had new onset AF and converted spontaneously. She underwent a PCI to her OM.     7/1/2024 no atrial fibrillation noted on monitor, 4 short PATs, fastest 124 bpm    Interval History:  History of Present Illness  The patient presents for a routine follow-up of atrial fibrillation.    She experienced an episode of atrial fibrillation in 02/2025, which lasted for a duration of 5 hours. She is currently on metoprolol for the management of this condition. There is no history of ablation procedures. She has expressed a desire to continue her Eliquis regimen due to her significant family history of cardiac conditions. Despite maintaining a healthy weight and adhering to her scheduled appointments, she experienced a sudden onset of symptoms last year. She is actively attempting to reduce her smoking habits, although she resides with a heavy smoker, which presents a challenge. She has brought with her the results of her most recent blood work, which was conducted during her physical examination, and all parameters were within normal limits.    SOCIAL HISTORY  She is trying to cut down on smoking but finds it difficult due to living with a heavy smoker.    FAMILY HISTORY  Her father had a heart attack, and her mother had strokes. Her sister has had an ablation. Her younger brother is having cardioversion, and her older brother just had it.    MEDICATIONS  metoprolol, Eliquis    Assessment & Plan      Follow-up  The patient will follow up in 1 year or sooner if any complications arise.        Assessment and plan:   Paroxysmal Atrial Fibrillation   - First noted 6/2024  - [id number] score:2 (Gender, CAD);

## 2025-04-09 ENCOUNTER — OFFICE VISIT (OUTPATIENT)
Dept: CARDIOLOGY CLINIC | Age: 65
End: 2025-04-09
Payer: COMMERCIAL

## 2025-04-09 VITALS
BODY MASS INDEX: 20.7 KG/M2 | SYSTOLIC BLOOD PRESSURE: 114 MMHG | WEIGHT: 136.6 LBS | DIASTOLIC BLOOD PRESSURE: 82 MMHG | HEIGHT: 68 IN

## 2025-04-09 DIAGNOSIS — I25.10 CORONARY ARTERY DISEASE INVOLVING NATIVE CORONARY ARTERY OF NATIVE HEART WITHOUT ANGINA PECTORIS: ICD-10-CM

## 2025-04-09 DIAGNOSIS — I47.10 SVT (SUPRAVENTRICULAR TACHYCARDIA): ICD-10-CM

## 2025-04-09 DIAGNOSIS — I48.0 PAF (PAROXYSMAL ATRIAL FIBRILLATION) (HCC): Primary | ICD-10-CM

## 2025-04-09 DIAGNOSIS — F17.200 SMOKER: ICD-10-CM

## 2025-04-09 PROCEDURE — 93000 ELECTROCARDIOGRAM COMPLETE: CPT | Performed by: INTERNAL MEDICINE

## 2025-04-09 PROCEDURE — 99214 OFFICE O/P EST MOD 30 MIN: CPT | Performed by: INTERNAL MEDICINE

## 2025-04-09 PROCEDURE — G2211 COMPLEX E/M VISIT ADD ON: HCPCS | Performed by: INTERNAL MEDICINE

## 2025-05-22 RX ORDER — CLOPIDOGREL BISULFATE 75 MG/1
75 TABLET ORAL DAILY
Qty: 90 TABLET | Refills: 2 | Status: SHIPPED | OUTPATIENT
Start: 2025-05-22

## 2025-05-22 NOTE — TELEPHONE ENCOUNTER
Received refill request for clopidogrel from [facility] pharmacy.    Last ov:2025 MXA    Last labs:2024 CBC     Last EK2025    Last Refill:2024 Take 1 tablet by mouth daily Dispense: 90 tablet, Refills: 2 ordered     Next appointment:2026 NPSR Recall list

## 2025-06-26 RX ORDER — APIXABAN 5 MG/1
5 TABLET, FILM COATED ORAL 2 TIMES DAILY
Qty: 180 TABLET | Refills: 3 | Status: SHIPPED | OUTPATIENT
Start: 2025-06-26

## 2025-06-26 NOTE — TELEPHONE ENCOUNTER
Refill request apixaban (ELIQUIS) 5 MG     Last OV:4/9/25 MXA    Last Lab:6/8/24 CBC    Next Appt:NONE

## 2025-08-26 RX ORDER — METOPROLOL TARTRATE 25 MG/1
25 TABLET, FILM COATED ORAL 2 TIMES DAILY
Qty: 180 TABLET | Refills: 1 | Status: SHIPPED | OUTPATIENT
Start: 2025-08-26

## (undated) DEVICE — Device

## (undated) DEVICE — HI-TORQUE PILOT 200 GUIDE WIRE .014 STRAIGHT TIP 3.0 CM X 190 CM: Brand: HI-TORQUE PILOT

## (undated) DEVICE — CATH LAB PACK: Brand: MEDLINE INDUSTRIES, INC.

## (undated) DEVICE — Device: Brand: FIELDER XT

## (undated) DEVICE — CANNULA SAMP CO2 AD GRN 7FT CO2 AND 7FT O2 TBNG UNIV CONN

## (undated) DEVICE — DRAPE,ANGIO,BRACH,STERILE,38X44: Brand: MEDLINE

## (undated) DEVICE — RUNTHROUGH NS HYPERCOAT PTCA GUIDEWIRE: Brand: RUNTHROUGH

## (undated) DEVICE — PAD, DEFIB, ADULT, RADIOTRANS, PHYSIO: Brand: MEDLINE

## (undated) DEVICE — CATHETER 5FR CORDIS PIG 145DEG 110CM

## (undated) DEVICE — CATHETER 5FR JL3.5 CORDIS 100CM

## (undated) DEVICE — KIT AT-X65 ANGIOTOUCH HAND CONTROLLER

## (undated) DEVICE — CATHETER GUID 6FR L100CM DIA0.071IN NYL SHFT EBU3.5

## (undated) DEVICE — Device: Brand: NOMOLINE™ LH ADULT NASAL CO2 CANNULA WITH O2 4M

## (undated) DEVICE — CATHETER DIAG L100CM DIA5.2FR 0.038IN POLYUR COR JR4 STD

## (undated) DEVICE — TR BAND RADIAL ARTERY COMPRESSION DEVICE: Brand: TR BAND

## (undated) DEVICE — CATH BLLN ANGIO 2.50X15MM SC EUPHORA RX

## (undated) DEVICE — 260 CM J TIP WIRE .035

## (undated) DEVICE — GLIDESHEATH SLENDER STAINLESS STEEL KIT: Brand: GLIDESHEATH SLENDER

## (undated) DEVICE — GUIDEWIRE WITH ICE™ HYDROPHILIC COATING: Brand: CHOICE™ PT

## (undated) DEVICE — Device: Brand: ASAHI GLADIUS MONGO14